# Patient Record
Sex: MALE | Race: OTHER | NOT HISPANIC OR LATINO
[De-identification: names, ages, dates, MRNs, and addresses within clinical notes are randomized per-mention and may not be internally consistent; named-entity substitution may affect disease eponyms.]

---

## 2023-04-11 PROBLEM — Z00.00 ENCOUNTER FOR PREVENTIVE HEALTH EXAMINATION: Status: ACTIVE | Noted: 2023-04-11

## 2023-04-12 ENCOUNTER — APPOINTMENT (OUTPATIENT)
Dept: OTOLARYNGOLOGY | Facility: CLINIC | Age: 28
End: 2023-04-12
Payer: MEDICAID

## 2023-04-12 VITALS — HEIGHT: 68 IN | BODY MASS INDEX: 27.58 KG/M2 | WEIGHT: 182 LBS

## 2023-04-12 PROCEDURE — 99204 OFFICE O/P NEW MOD 45 MIN: CPT | Mod: 25

## 2023-04-12 PROCEDURE — 31231 NASAL ENDOSCOPY DX: CPT

## 2023-04-12 RX ORDER — METHYLPREDNISOLONE 4 MG/1
4 TABLET ORAL
Qty: 5 | Refills: 0 | Status: ACTIVE | COMMUNITY
Start: 2023-04-12 | End: 1900-01-01

## 2023-04-12 NOTE — ASSESSMENT
[FreeTextEntry1] : Report of CT from pre-op in [place] reviewed showing ethmoidal polypoid tissue.\par \par Report of ESS surgery reviewed.\par \par Patient advised to stop oxymetazoline spray immediately. continue flonase and azelastine for now. \par RTC in 3 weeks then will probably do a CT if needed.

## 2023-04-12 NOTE — PROCEDURE
[Recalcitrant Symptoms] : recalcitrant symptoms  [Post-Op Patency] : post-op patency [Anterior rhinoscopy insufficient to account for symptoms] : anterior rhinoscopy insufficient to account for symptoms [None] : none [Rigid Endoscope] : examined with a rigid endoscope [Congested] : congested [Debbie] : debbie [Normal] : the paranasal sinuses had no abnormalities

## 2023-04-12 NOTE — REASON FOR VISIT
[Initial Evaluation] : an initial evaluation for [FreeTextEntry2] : sinusitis , b/l inferior turbinate hypertrophy

## 2023-04-12 NOTE — HISTORY OF PRESENT ILLNESS
[de-identified] : Patient presents today c/o sinusitis , b/l inferior turbinate hypertrophy .  Patient states he has nasal surgery in 2019.  He does not recall the exact name of the surgery .  The last few  years his nose has been feeling clogged and he can not breathe well without using nasal sprays. He uses oxymetazoline daily.  He sometimes snores at night.  He mostly has these symptoms at night,

## 2023-05-12 ENCOUNTER — APPOINTMENT (OUTPATIENT)
Dept: OTOLARYNGOLOGY | Facility: CLINIC | Age: 28
End: 2023-05-12
Payer: MEDICAID

## 2023-05-12 PROCEDURE — 31231 NASAL ENDOSCOPY DX: CPT

## 2023-05-12 PROCEDURE — 99213 OFFICE O/P EST LOW 20 MIN: CPT | Mod: 25

## 2023-05-12 NOTE — HISTORY OF PRESENT ILLNESS
[FreeTextEntry1] : Patient returns today  c/o rhinitis  medicamentosa , nasal obstruction . He has  finished  taking methylprednisolone and flonase/azelastine  , He does feel better, but still uses oxymetazoline at night.

## 2023-05-12 NOTE — REASON FOR VISIT
[Subsequent Evaluation] : a subsequent evaluation for [FreeTextEntry2] : rhinitis  medicamentosa , nasal obstruction

## 2023-05-12 NOTE — PROCEDURE
[Recalcitrant Symptoms] : recalcitrant symptoms  [Anterior rhinoscopy insufficient to account for symptoms] : anterior rhinoscopy insufficient to account for symptoms [None] : none [Rigid Endoscope] : examined with a rigid endoscope [Congested] : congested [Debbie] : debbie [Deviated to the Lt] : deviated to the left [Normal] : the middle meatus had no abnormalities

## 2023-06-05 ENCOUNTER — OUTPATIENT (OUTPATIENT)
Dept: OUTPATIENT SERVICES | Facility: HOSPITAL | Age: 28
LOS: 1 days | End: 2023-06-05
Payer: MEDICAID

## 2023-06-05 DIAGNOSIS — J31.0 CHRONIC RHINITIS: ICD-10-CM

## 2023-06-05 DIAGNOSIS — J34.89 OTHER SPECIFIED DISORDERS OF NOSE AND NASAL SINUSES: ICD-10-CM

## 2023-06-05 PROCEDURE — 70486 CT MAXILLOFACIAL W/O DYE: CPT

## 2023-06-05 PROCEDURE — 70486 CT MAXILLOFACIAL W/O DYE: CPT | Mod: 26

## 2023-06-06 DIAGNOSIS — J31.0 CHRONIC RHINITIS: ICD-10-CM

## 2023-06-06 DIAGNOSIS — J34.89 OTHER SPECIFIED DISORDERS OF NOSE AND NASAL SINUSES: ICD-10-CM

## 2023-06-28 ENCOUNTER — APPOINTMENT (OUTPATIENT)
Dept: OTOLARYNGOLOGY | Facility: CLINIC | Age: 28
End: 2023-06-28
Payer: MEDICAID

## 2023-06-28 VITALS — BODY MASS INDEX: 27.67 KG/M2 | WEIGHT: 182 LBS

## 2023-06-28 DIAGNOSIS — H61.23 IMPACTED CERUMEN, BILATERAL: ICD-10-CM

## 2023-06-28 PROCEDURE — 31231 NASAL ENDOSCOPY DX: CPT

## 2023-06-28 PROCEDURE — 69210 REMOVE IMPACTED EAR WAX UNI: CPT

## 2023-06-28 PROCEDURE — 99214 OFFICE O/P EST MOD 30 MIN: CPT | Mod: 25

## 2023-06-28 RX ORDER — SODIUM CHLORIDE 0.65 %
0.65 AEROSOL, SPRAY (ML) NASAL TWICE DAILY
Qty: 2 | Refills: 0 | Status: ACTIVE | COMMUNITY
Start: 2023-06-28 | End: 1900-01-01

## 2023-06-28 NOTE — DATA REVIEWED
[de-identified] : 6/05/2023      CT Maxillofacial \par \par \par  CT Maxillofacial No Cont             Final\par \par No Documents Attached\par \par \par \par \par   ACC: [id number]     EXAM:  CT MAXILLOFACIAL   ORDERED BY: LEYLA TRUONG\par \par PROCEDURE DATE:  06/05/2023\par \par \par \par INTERPRETATION:  CT EXAMINATION OF THE PARANASAL SINUSES\par \par CLINICAL INDICATION: Persistent symptoms after surgery.\par \par TECHNIQUE: CT examination of the paranasal sinuses was performed. These images were used to create axial, coronal and sagittal reformatted images through the paranasal sinuses. No intravenous contrast was administered.  The images were reviewed in bone and soft-tissue windows.\par \par COMPARISON: None available\par \par FINDINGS:\par \par Sinocranial & sinoorbital junctions: There is moderate osseous erosion in the right planum sphenoidale, cribriform plate, and lateral lamella. There is focal osseous dehiscence in the left planum sphenoidale. The fovea ethmoidalis are intact.\par \par Nasal septum and nasal cavity: There is leftward deviation of the vomer with bony spur to the left. There is rightward deviation of the anterior perpendicular plate. There is mildly expansile opacification in the superior right nasal cavity and in the right ethmoid sinus, obscuring the right superior and middle turbinates.\par \par Frontal sinuses, drainage pathways, and associated anatomic variants: There there is moderate polypoid mucosal thickening in bilateral frontal sinuses, right more the left, with opacification of the bilateral frontal sinus outflow tracts.\par \par Ethmoid sinuses: Status post right sphenoethmoidectomy. There is mildly expansile soft tissue density completely opacifying the right ethmoid sinus. There is scattered demineralization of the remaining anterior ethmoid septum. There is focal dehiscence in the posterior right lamina papyracea at the region of the orbital apex. There is partial opacification of the ethmoid sinus with demineralization of the septum. There is focal patchy hyperdense opacification in the posterior left ethmoid cell which could reflect fungal conization versus inspissated mucus.\par \par Sphenoid sinuses, drainage pathways, and associated variants: Status post left sphenoidotomy. Mild mucosal thickening in bilateral sphenoid sinuses. There are focal dehiscence in the bony covering of the medial right carotid canal in the lateral wall of the right sphenoid sinus, as well as in the medial walls of the bilateral foramen rotundum. The remaining carotid canals are covered by bone. Additional focal osseous dehiscence in the left optic strut.\par \par Maxillary sinuses, drainage pathways, and associated variants: Status post right maxillary antrostomy. there is moderate polypoid mucosal thickening the right maxillary sinus. There are large osseous dehiscence in the medial and posterior walls of the left maxillary sinus. There is partial opacification of the left maxillary sinus with large dehiscence in the posterior wall. There is effacement of the left retromaxillary fat pad. The fat pad within the left pterygopalatine fossa is preserved. There is dehiscence/demineralization of the left infraorbital canal.\par \par Other:\par Partially visualized intracranial structures: Normal\par Orbits: Normal\par Mastoid air cells: Normal\par Temporomandibular joints: Unremarkable.\par \par \par IMPRESSION:\par \par Postsurgical changes reflecting right sphenoethmoidectomy and bilateral maxillary antrostomies.\par \par Extensive scattered paranasal opacifications, worse in the right frontal/ethmoid sinuses and left maxillary sinus. Scattered osseous dehiscence/erosions along the sinus walls as above, most notably in the roof of the ethmoid sinuses (right more than left) and right cribriform plate. Large osseous dehiscence in the medial and posterior walls of the left maxillary sinus with effacement of the left retromaxillary fat pad. Additional focal osseous dehiscence as above. The finding could reflect sequelae of chronic invasive fungal sinusitis.\par \par Nasal septal deviation.\par \par \par \par --- End of Report ---\par \par \par \par \par \par JESSICA JUSTIN MD; Attending Radiologist\par This document has been electronically signed. Jun 5 2023  2:20PM\par \par  \par \par  Ordered by: LEYLA FUENTES       Collected/Examined: 05Jun2023 09:56AM       \par Verified by: LEYLA FUENTES 05Jun2023 03:05PM       \par  Result Communication: No patient communication needed at this time;\par Stage: Final       \par  Performed at: [facility]       Resulted: [id number] 12:51PM       Last Updated: 05Jun2023 03:05PM       Accession: [id number]

## 2023-06-28 NOTE — ASSESSMENT
[FreeTextEntry1] : I personally reviewed, interpreted and discussed patient's CT images showing chronic post-operative changes. possible dehiscence of skull base.\par No CSF leak suspicion on history .\par \par Patient stopped using oxymethazoline and feels a lot better.

## 2023-06-28 NOTE — HISTORY OF PRESENT ILLNESS
[FreeTextEntry1] : Patient returns today on Rhinitis medicamentosa ,  nasal obstruction.   Patient had a CT Maxillofacial done 06/05/2023 and is here to review the test results.  Patient states he has some improvement with his symptom and has no complaints. Has completely stopped taking oxymethazoline.

## 2023-06-28 NOTE — PHYSICAL EXAM
[Nasal Endoscopy Performed] : nasal endoscopy was performed, see procedure section for findings [Normal] : mucosa is normal [Midline] : trachea located in midline position [de-identified] : bilateral impacted wax cleaned with curette

## 2023-08-21 ENCOUNTER — APPOINTMENT (OUTPATIENT)
Dept: OTOLARYNGOLOGY | Facility: CLINIC | Age: 28
End: 2023-08-21

## 2023-11-01 ENCOUNTER — APPOINTMENT (OUTPATIENT)
Dept: OTOLARYNGOLOGY | Facility: CLINIC | Age: 28
End: 2023-11-01
Payer: MEDICAID

## 2023-11-01 DIAGNOSIS — T48.5X5A CHRONIC RHINITIS: ICD-10-CM

## 2023-11-01 DIAGNOSIS — J31.0 CHRONIC RHINITIS: ICD-10-CM

## 2023-11-01 DIAGNOSIS — J34.89 OTHER SPECIFIED DISORDERS OF NOSE AND NASAL SINUSES: ICD-10-CM

## 2023-11-01 DIAGNOSIS — Z98.890 OTHER SPECIFIED POSTPROCEDURAL STATES: ICD-10-CM

## 2023-11-01 PROCEDURE — 31231 NASAL ENDOSCOPY DX: CPT

## 2023-11-01 PROCEDURE — 99213 OFFICE O/P EST LOW 20 MIN: CPT | Mod: 25

## 2023-11-01 RX ORDER — FLUTICASONE PROPIONATE 50 UG/1
50 SPRAY, METERED NASAL
Qty: 1 | Refills: 4 | Status: ACTIVE | COMMUNITY
Start: 2023-06-28 | End: 1900-01-01

## 2024-04-04 ENCOUNTER — APPOINTMENT (OUTPATIENT)
Dept: OTOLARYNGOLOGY | Facility: CLINIC | Age: 29
End: 2024-04-04
Payer: COMMERCIAL

## 2024-04-04 DIAGNOSIS — B49 OTHER ALLERGIC RHINITIS: ICD-10-CM

## 2024-04-04 DIAGNOSIS — J30.89 OTHER ALLERGIC RHINITIS: ICD-10-CM

## 2024-04-04 PROCEDURE — 31231 NASAL ENDOSCOPY DX: CPT

## 2024-04-04 PROCEDURE — 99204 OFFICE O/P NEW MOD 45 MIN: CPT | Mod: 25

## 2024-04-04 NOTE — CONSULT LETTER
[Dear  ___] : Dear  [unfilled], [Courtesy Letter:] : I had the pleasure of seeing your patient, [unfilled], in my office today. [Consult Closing:] : Thank you very much for allowing me to participate in the care of this patient.  If you have any questions, please do not hesitate to contact me. [Sincerely,] : Sincerely, [FreeTextEntry3] : Gatito Cao MD Department of Otolaryngology - [facility]

## 2024-04-04 NOTE — ASSESSMENT
[FreeTextEntry1] : 28M here for initial evaluation. He reports long standing h/o nasal congestion/obstruction, facial pressure, headache and thick yellow mucus. He had surgery in [place] in 2019 after which he did well for 1-2 years, but sx have been worsening. After his surgery he recalls a protracted wound healing time and bedrest. There is no facial pain or numbness, no issues chewing or eating. 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 Recent CT sinus shows e/o prior sinus surgery with pansinus opacification and polypoid mucosal thickened; there are multiple areas of skull base dehiscence involving the right fovea/cribriform and planum, and left fovea and lateral maxillary sinus wall with osteitic bone throughout and areas of heterogenous soft tissue. MRI shows near complete opacification with enhancing T1 and T2 hypointense material of the right ethmoid extending into the sphenoid sinus/right anterior clinoid process; there is a similar enhancing lesion in the left maxillary sinus with extension posteriorly into the left  space involving the muscles of mastication and probably the bilateral pterygopalatine fossa. There is no abnormal dural or orbital signal. On exam, nasal endoscopy shows inferior turbinate hypertrophy with right septal deviation, diffuse sinonasal mucosal edema with e/o prior limited ESS w middle meatal obstruction. This pt has chronic pansinusitis and likely fungal sinusitis given history, bloodwork and imaging. He is symptomatic despite a prior surgery, which was incomplete, in 2019. There is also abnormal enhancing material in both the right ethmoid/sphenoid w demineralized adjacent bone, and also in the left maxillary sinus with extension to the  space all the way towards the TMJ with erosion of the lateral maxillary sinus wall - this is not usually typical of AFS and other likely etiologies include chronic invasive fungal sinusitis or sarcoidosis and EGPA. Regardless, given his history and imaging, he no doubt needs revision surgery to remove the allergic mucinous fungal debris and establish paranasal sinus patency. Further, he needs endoscopic examination under anesthesia with potential biopsy/resection of the material in the right sphenoid and retroantral spaces. This was all discussed and all questions answered. He will need to inform his parents of this, who currently live in [place], with whom he would like to recover, prior to proceeding. He will keep me posted about this, but I reiterated it should be done ASAP.

## 2024-04-04 NOTE — HISTORY OF PRESENT ILLNESS
[de-identified] : 28M here for initial evaluation.  He reports long standing h/o nasal congestion/obstruction, facial pressure, headache and thick yellow mucus. He had surgery in [place] in 2019 after which he did well for 1-2 years, but sx have been worsening.  After his surgery he recalls a protracted wound healing time and bedrest. There is no facial pain or numbness, no issues chewing or eating. 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  CT Sinus 3/11/24 (I reviewed): -e/o prior ESS w pansinus mucosal opacification -multiple areas of skull base dehiscence involving the right fovea/cribriform and planum, and left fovea and lateral maxilllary sinus wall. -osetitic bone throughout and heterogenous soft tissue  MRI Face 3/27/24: -Questionable dural involvement at the site of the dehiscence of the right cribriform plate/fovea ethmoidalis, without definite signal abnormality or enhancement in the underlying brain parenchyma.  -Near complete opacification with enhancing T1 and T2 hypointense material of the right ethmoid sinuses extending posteriorly into the sphenoid sinus/right anterior clinoid process. Similar enhancing lesion in the left maxillary sinus. There is extension posteriorly into the left  space involving the muscles of mastication and probably the bilateral pterygopalatine fossa. Questionable minimal orbital involvement at the right medial orbital wall and left orbital floor without significant inflammatory change or enhancement in the extraconal fat  ROS otherwise unremarkable.

## 2024-04-04 NOTE — PROCEDURE
[FreeTextEntry3] : Nasal Endoscopy: inferior turbinate hypertrophy right septal deviation diffuse sinonasal mucosal edema e/o prior limited ESS w middle meatal obstruction no mucopus

## 2024-04-04 NOTE — DATA REVIEWED
[de-identified] : MRI 3/2024: FINDINGS:  Evaluation of the dehiscence at the left posterolateral maxillary sinus wall, left medial orbital wall and left orbital floor, right cribriform plate, fovea ethmoidalis, planum sphenoidale and posterior right orbit are better appreciated on CT 3/11/2024.  Near complete opacification with enhancing T1 and T2 hypointense material of the right ethmoid sinuses extending posteriorly into the sphenoid sinus/right anterior clinoid process. Similar enhancing lesion in the left maxillary sinus. There is extension posteriorly into the left  space involving the muscles of mastication and probably the bilateral pterygopalatine fossa. Questionable minimal orbital involvement at the right medial orbital wall and left orbital floor without significant inflammatory change or enhancement in the extraconal fat. Findings may reflect sinonasal malignancy, sinonasal lymphoma, sarcoidosis and granulomatosis with polyangiitis on other etiologies. Biopsy is recommended. Otherwise mild to moderate sinus opacification with mucous retention cysts or secretions or mucosal thickening. Coexistent sinusitis cannot be excluded.  Evaluation of the dehiscence at the left posterolateral maxillary sinus wall, left medial orbital wall and left orbital floor, right cribriform plate, fovea ethmoidalis, planum sphenoidale and posterior right orbit are better appreciated on CT 3/11/2024.  Questionable dural involvement at the site of the dehiscence of the right cribriform plate/fovea ethmoidalis, 107:21 without definite signal abnormality or enhancement in the underlying brain parenchyma. Clinical correlation and dedicated MRI of the brain with and without contrast recommended to exclude intracranial spread or infection  Evaluation of ostiomeatal units is limited by modality and coexistent disease. Leftward nasal septal deviation and spurring inferiorly with mild rightward bowing superiorly is better appreciated on recent CT.  The visualized soft tissues of the nasopharynx, orbits and brain are unremarkable.  The mastoid air cells are normally aerated.  IMPRESSION: 1. Questionable dural involvement at the site of the dehiscence of the right cribriform plate/fovea ethmoidalis, without definite signal abnormality or enhancement in the underlying brain parenchyma. Clinical correlation and dedicated MRI of the brain with and without contrast on a 3 Romina magnet with 1 mm postcontrast T1 and FLAIR images recommended to exclude intracranial spread of infection, inflammation or neoplasm. 2. Near complete opacification with enhancing T1 and T2 hypointense material of the right ethmoid sinuses extending posteriorly into the sphenoid sinus/right anterior clinoid process. Similar enhancing lesion in the left maxillary sinus. There is extension posteriorly into the left  space involving the muscles of mastication and probably the bilateral pterygopalatine fossa. Questionable minimal orbital involvement at the right medial orbital wall and left orbital floor without significant inflammatory change or enhancement in the extraconal fat  CT Sinus 3/2024: Paranasal sinuses: There is polypoid mucosal thickening of the paranasal sinuses with complete opacification of the sphenoid sinuses, ethmoid air cells, and frontal sinuses. There is partial opacification of the left maxillary sinus and mild mucosal thickening of the right maxillary sinus. There is dehiscence of the posterior lateral wall of the left maxilla. The mucosal disease cannot be  from the left pterygoid muscles. There is dehiscence of the medial wall of the left orbit as well as the floor of the left orbit. There is dehiscence cribriform plate, fovea ethmoidalis and the planum sphenoidale on the right. There is dehiscence of the posterior medial wall of the right orbit. There is coalescence of the ethmoid air cells. There is a 1.4 cm hyperdensity posterior left ethmoid air cell which may resent inspissated secretions. There are bilateral medial antrostomies. Air/fluid levels: None. Mastoids: Clear bilaterally.  Orbital soft tissues: Intra orbital soft tissues are intact and symmetric.   Other bones: The nasal bones, zygomatic arches, pterygoid plates and hard palate are intact. The temporal mandibular joints are intact. Nasal septum: Deviated towards the right anteriorly. Nasal cavity & nasopharynx: There is opacification of the right nasal cavity adjacent to the right middle turbinate. This is contiguous with the ethmoid air cells. Brain: No hydrocephalus or midline shift in this limited evaluation.  IMPRESSION: 1. There is extensive polypoid mucosal thickening of the paranasal sinuses with near complete opacification of the sphenoid sinuses, ethmoid air cells and frontal sinuses. There is partial opacification left maxillary sinus and mild mucosal thickening right maxillary sinus. 2. There is dehiscence of the posterior lateral wall of the left maxilla. Soft tissue densities as mucosal disease in the left maxillary sinus cannot be  from the left pterygoid muscles. 3. There is dehiscence of the medial wall of the left orbit as well as the floor of the left orbit. 4. There is dehiscence of the cribriform plate, fovea ethmoidalis and the planum sphenoidale on the right. 5. There is dehiscence of the medial wall of the right orbit posteriorly. 6. There is coalescence of the ethmoid air cells. 7. There are bilateral medial antrostomies. Correlation with prior surgical history is suggested.

## 2024-04-12 ENCOUNTER — APPOINTMENT (OUTPATIENT)
Dept: OTOLARYNGOLOGY | Facility: CLINIC | Age: 29
End: 2024-04-12
Payer: COMMERCIAL

## 2024-04-12 VITALS
BODY MASS INDEX: 25.76 KG/M2 | HEIGHT: 68 IN | DIASTOLIC BLOOD PRESSURE: 74 MMHG | WEIGHT: 170 LBS | TEMPERATURE: 97.7 F | HEART RATE: 62 BPM | SYSTOLIC BLOOD PRESSURE: 113 MMHG

## 2024-04-12 DIAGNOSIS — J34.89 OTHER SPECIFIED DISORDERS OF NOSE AND NASAL SINUSES: ICD-10-CM

## 2024-04-12 PROCEDURE — 99213 OFFICE O/P EST LOW 20 MIN: CPT | Mod: 25

## 2024-04-12 PROCEDURE — 31231 NASAL ENDOSCOPY DX: CPT

## 2024-04-12 NOTE — ASSESSMENT
[FreeTextEntry1] : 28M here in followup. He reports long standing h/o nasal congestion/obstruction, facial pressure, headache and thick yellow mucus. He had surgery in [place] in 2019 after which he did well for 1-2 years, but sx have been worsening. After his surgery he recalls a protracted wound healing time and bedrest. There is no facial pain or numbness, no issues chewing or eating. 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 Recent CT sinus shows e/o prior sinus surgery with pansinus opacification and polypoid mucosal thickened; there are multiple areas of skull base dehiscence involving the right fovea/cribriform and planum, and left fovea and lateral maxillary sinus wall with osteitic bone throughout and areas of heterogenous soft tissue. MRI shows near complete opacification with enhancing T1 and T2 hypointense material of the right ethmoid extending into the sphenoid sinus/right anterior clinoid process; there is a similar enhancing lesion in the left maxillary sinus with extension posteriorly into the left  space involving the muscles of mastication and probably the bilateral pterygopalatine fossa. There is no abnormal dural or orbital signal. On exam, nasal endoscopy shows inferior turbinate hypertrophy with right septal deviation, diffuse sinonasal mucosal edema with e/o prior limited ESS w middle meatal obstruction. This pt has chronic pansinusitis with prior sinus surgery in 2019 with reported h/o fungal sinusitis and fungal allergy on bloodwork. There is also abnormal enhancing material in both the right ethmoid/sphenoid w demineralized adjacent bone, and also in the left maxillary sinus with extension to the  space all the way towards the TMJ with erosion of the lateral maxillary sinus wall - this is not typical of AFS and signal is NOT consistent with fungus on MRI, rather a very cellular process favoring lymphoma; other possibilities include granulomatous disease like chronic invasive fungal sinusitis or sarcoidosis or EGPA. At this point - he really needs endoscopic examination under anesthesia with biopsy of the material in the right sphenoid and retroantral spaces. This was all discussed and all questions answered. This was reviewed at length and all questions answered - plan for this in the next 2-3 weeks.

## 2024-04-12 NOTE — CONSULT LETTER
[Dear  ___] : Dear  [unfilled], [Courtesy Letter:] : I had the pleasure of seeing your patient, [unfilled], in my office today. [Consult Closing:] : Thank you very much for allowing me to participate in the care of this patient.  If you have any questions, please do not hesitate to contact me. [Sincerely,] : Sincerely, [FreeTextEntry3] : Gatito Cao MD Department of Otolaryngology - [facility] [DrAj  ___] : Dr. BACK

## 2024-04-12 NOTE — HISTORY OF PRESENT ILLNESS
[de-identified] : 28M here in followup.  He reports long standing h/o nasal congestion/obstruction, facial pressure, headache and thick yellow mucus. He had surgery in [place] in 2019 after which he did well for 1-2 years, but sx have been worsening.  After his surgery he recalls a protracted wound healing time and bedrest. There is no facial pain or numbness, no issues chewing or eating. He does have this very uncomfortable feeling in his 'face' on the left side, most apparent at night when trying to sleep. 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  CT Sinus 3/11/24 (I reviewed): -e/o prior ESS w pansinus mucosal opacification -multiple areas of skull base dehiscence involving the right fovea/cribriform and planum, and left fovea and lateral maxilllary sinus wall. -osetitic bone throughout and heterogenous soft tissue  MRI Face 3/27/24: -Questionable dural involvement at the site of the dehiscence of the right cribriform plate/fovea ethmoidalis, without definite signal abnormality or enhancement in the underlying brain parenchyma.  -Near complete opacification with enhancing T1 and T2 hypointense material of the right ethmoid sinuses extending posteriorly into the sphenoid sinus/right anterior clinoid process. Similar enhancing lesion in the left maxillary sinus. There is extension posteriorly into the left  space involving the muscles of mastication and probably the bilateral pterygopalatine fossa. Questionable minimal orbital involvement at the right medial orbital wall and left orbital floor without significant inflammatory change or enhancement in the extraconal fat  ROS otherwise unremarkable.

## 2024-04-30 ENCOUNTER — TRANSCRIPTION ENCOUNTER (OUTPATIENT)
Age: 29
End: 2024-04-30

## 2024-04-30 NOTE — ASU PATIENT PROFILE, ADULT - NSICDXPASTSURGICALHX_GEN_ALL_CORE_FT
PAST SURGICAL HISTORY:  No significant past surgical history PAST SURGICAL HISTORY:  H/O sinus surgery

## 2024-04-30 NOTE — ASU PATIENT PROFILE, ADULT - NSICDXPASTMEDICALHX_GEN_ALL_CORE_FT
PAST MEDICAL HISTORY:  No pertinent past medical history PAST MEDICAL HISTORY:  Asthma childhood

## 2024-04-30 NOTE — ASU PATIENT PROFILE, ADULT - NS PREOP UNDERSTANDS INFO
Spoke to patient  to be NPO/NO solid foods after 2200 pm on tonight. allow to drink water  or apple juice till 12Mn,  dres scomfortable, leave all valuable a t home,  Bring ID photo and  insurance cards,  escort arranged , address  and telephone given to patient/yes

## 2024-05-01 ENCOUNTER — APPOINTMENT (OUTPATIENT)
Dept: OTOLARYNGOLOGY | Facility: HOSPITAL | Age: 29
End: 2024-05-01

## 2024-05-01 ENCOUNTER — RESULT REVIEW (OUTPATIENT)
Age: 29
End: 2024-05-01

## 2024-05-01 ENCOUNTER — TRANSCRIPTION ENCOUNTER (OUTPATIENT)
Age: 29
End: 2024-05-01

## 2024-05-01 ENCOUNTER — OUTPATIENT (OUTPATIENT)
Dept: OUTPATIENT SERVICES | Facility: HOSPITAL | Age: 29
LOS: 1 days | Discharge: HOME | End: 2024-05-01
Payer: MEDICAID

## 2024-05-01 VITALS
HEIGHT: 68 IN | DIASTOLIC BLOOD PRESSURE: 79 MMHG | WEIGHT: 165.57 LBS | RESPIRATION RATE: 14 BRPM | HEART RATE: 65 BPM | TEMPERATURE: 98 F | SYSTOLIC BLOOD PRESSURE: 125 MMHG | OXYGEN SATURATION: 98 %

## 2024-05-01 VITALS
HEART RATE: 77 BPM | SYSTOLIC BLOOD PRESSURE: 110 MMHG | TEMPERATURE: 98 F | RESPIRATION RATE: 16 BRPM | DIASTOLIC BLOOD PRESSURE: 59 MMHG | OXYGEN SATURATION: 99 %

## 2024-05-01 DIAGNOSIS — Z98.890 OTHER SPECIFIED POSTPROCEDURAL STATES: Chronic | ICD-10-CM

## 2024-05-01 PROCEDURE — 88305 TISSUE EXAM BY PATHOLOGIST: CPT | Mod: 26

## 2024-05-01 PROCEDURE — 61782 SCAN PROC CRANIAL EXTRA: CPT

## 2024-05-01 PROCEDURE — 88311 DECALCIFY TISSUE: CPT | Mod: 26

## 2024-05-01 PROCEDURE — 31267 ENDOSCOPY MAXILLARY SINUS: CPT | Mod: 50

## 2024-05-01 PROCEDURE — 31259 NSL/SINS NDSC SPHN TISS RMVL: CPT | Mod: 50

## 2024-05-01 PROCEDURE — 88342 IMHCHEM/IMCYTCHM 1ST ANTB: CPT | Mod: 26

## 2024-05-01 PROCEDURE — 30930 THER FX NASAL INF TURBINATE: CPT

## 2024-05-01 PROCEDURE — 88331 PATH CONSLTJ SURG 1 BLK 1SPC: CPT | Mod: 26

## 2024-05-01 DEVICE — SURGIFLO HEMOSTATIC MATRIX KIT: Type: IMPLANTABLE DEVICE | Status: FUNCTIONAL

## 2024-05-01 RX ORDER — APREPITANT 80 MG/1
40 CAPSULE ORAL ONCE
Refills: 0 | Status: COMPLETED | OUTPATIENT
Start: 2024-05-01 | End: 2024-05-01

## 2024-05-01 RX ORDER — ACETAMINOPHEN 500 MG
1000 TABLET ORAL ONCE
Refills: 0 | Status: COMPLETED | OUTPATIENT
Start: 2024-05-01 | End: 2024-05-01

## 2024-05-01 RX ORDER — SODIUM CHLORIDE 9 MG/ML
500 INJECTION, SOLUTION INTRAVENOUS
Refills: 0 | Status: ACTIVE | OUTPATIENT
Start: 2024-05-01 | End: 2025-03-30

## 2024-05-01 RX ORDER — BUDESONIDE 0.5 MG/2ML
0.5 INHALANT ORAL
Qty: 3 | Refills: 3 | Status: ACTIVE | COMMUNITY
Start: 2024-05-01 | End: 1900-01-01

## 2024-05-01 RX ORDER — RACEPINEPHRINE HCL 2.25 %
1 SOLUTION, NON-ORAL TOPICAL ONCE
Refills: 0 | Status: ACTIVE | OUTPATIENT
Start: 2024-05-01

## 2024-05-01 RX ADMIN — APREPITANT 40 MILLIGRAM(S): 80 CAPSULE ORAL at 07:12

## 2024-05-01 RX ADMIN — SODIUM CHLORIDE 100 MILLILITER(S): 9 INJECTION, SOLUTION INTRAVENOUS at 10:29

## 2024-05-01 RX ADMIN — Medication 1000 MILLIGRAM(S): at 07:13

## 2024-05-01 NOTE — BRIEF OPERATIVE NOTE - OPERATION/FINDINGS
Left nasal cavity: fibrous mass involving maxillary sinus inferior turbinate, posterior ethmoids. Fungus ball within posterior ethmoid air cell, purulence from sphenoid  Right nasal cavity: fibrous mass arising from posterior ethmoid air cells, involving middle turbinate

## 2024-05-01 NOTE — ASU DISCHARGE PLAN (ADULT/PEDIATRIC) - CARE PROVIDER_API CALL
Gatito Cao  Otolaryngology  [address] [place] [address], [address]  [place] [address]  Phone: [phone]  Fax: [phone]  Follow Up Time:

## 2024-05-01 NOTE — BRIEF OPERATIVE NOTE - NSICDXBRIEFPROCEDURE_GEN_ALL_CORE_FT
PROCEDURES:  Endoscopy, nasal, with biopsy 01-May-2024 09:26:32  Mendelsohn, Matthew  Endoscopic bilateral sphenoidotomy 01-May-2024 09:26:47  Mendelsohn, Matthew  Endoscopic partial ethmoidectomy 01-May-2024 09:27:22  Mendelsohn, Matthew

## 2024-05-03 LAB
-  AMOXICILLIN/CLAVULANIC ACID: SIGNIFICANT CHANGE UP
-  AMPICILLIN/SULBACTAM: SIGNIFICANT CHANGE UP
-  AMPICILLIN: SIGNIFICANT CHANGE UP
-  CEFAZOLIN: SIGNIFICANT CHANGE UP
-  CEFEPIME: SIGNIFICANT CHANGE UP
-  CEFOXITIN: SIGNIFICANT CHANGE UP
-  CEFTRIAXONE: SIGNIFICANT CHANGE UP
-  CIPROFLOXACIN: SIGNIFICANT CHANGE UP
-  CLINDAMYCIN: SIGNIFICANT CHANGE UP
-  ERYTHROMYCIN: SIGNIFICANT CHANGE UP
-  GENTAMICIN: SIGNIFICANT CHANGE UP
-  GENTAMICIN: SIGNIFICANT CHANGE UP
-  LEVOFLOXACIN: SIGNIFICANT CHANGE UP
-  OXACILLIN: SIGNIFICANT CHANGE UP
-  PENICILLIN: SIGNIFICANT CHANGE UP
-  PIPERACILLIN/TAZOBACTAM: SIGNIFICANT CHANGE UP
-  RIFAMPIN: SIGNIFICANT CHANGE UP
-  TETRACYCLINE: SIGNIFICANT CHANGE UP
-  TOBRAMYCIN: SIGNIFICANT CHANGE UP
-  TRIMETHOPRIM/SULFAMETHOXAZOLE: SIGNIFICANT CHANGE UP
-  TRIMETHOPRIM/SULFAMETHOXAZOLE: SIGNIFICANT CHANGE UP
-  VANCOMYCIN: SIGNIFICANT CHANGE UP
METHOD TYPE: SIGNIFICANT CHANGE UP
METHOD TYPE: SIGNIFICANT CHANGE UP
SURGICAL PATHOLOGY STUDY: SIGNIFICANT CHANGE UP

## 2024-05-06 LAB
CULTURE RESULTS: ABNORMAL
ORGANISM # SPEC MICROSCOPIC CNT: ABNORMAL
ORGANISM # SPEC MICROSCOPIC CNT: ABNORMAL
ORGANISM # SPEC MICROSCOPIC CNT: SIGNIFICANT CHANGE UP
SPECIMEN SOURCE: SIGNIFICANT CHANGE UP

## 2024-05-07 ENCOUNTER — APPOINTMENT (OUTPATIENT)
Dept: OTOLARYNGOLOGY | Facility: CLINIC | Age: 29
End: 2024-05-07
Payer: COMMERCIAL

## 2024-05-07 DIAGNOSIS — J32.4 CHRONIC PANSINUSITIS: ICD-10-CM

## 2024-05-07 PROBLEM — J45.909 UNSPECIFIED ASTHMA, UNCOMPLICATED: Chronic | Status: ACTIVE | Noted: 2024-05-01

## 2024-05-07 PROCEDURE — 99024 POSTOP FOLLOW-UP VISIT: CPT

## 2024-05-07 PROCEDURE — 31237 NSL/SINS NDSC SURG BX POLYPC: CPT | Mod: 58

## 2024-05-07 NOTE — PROCEDURE
[FreeTextEntry3] : Nasal Endoscopy: crusting and scab removed nasal airways patent no mucopus no necrosis choana clear

## 2024-05-07 NOTE — ASSESSMENT
[FreeTextEntry1] : 28M here in first postoperative visit s/p ESS (max, ethmoid, sphenoid) with directed biopsies 5/1/24. Intraoperatively, severely sclerotic infiltrative process in left retroantral/infratemporal and ethmoids, and right sphenoethmoid region with purulence in the left sphenoid. Pathology is most c/w chronic invasive fungal sinusitis, as above. He is doing well since surgery. There is no pain or congestion. He is using budesonide rinses. On exam, nasal endoscopy shows expected postoperative changes. This case is very unique in its presentation and to the degree and extent of irregular, infiltrative and sclerotic disease throughout both separate lesions. Pathology seems c/w chronic invasive fungal sinusitis. PCR testing is pending on exactly which organism - mucor vs aspergillus. For now, he will continue budesonide rinses. He db see my ID colleague to start aggressive, prolonged antifungal treatment which will be the next step. RTO 4 weeks.

## 2024-05-07 NOTE — DATA REVIEWED
[de-identified] : MRI 3/2024: FINDINGS:  Evaluation of the dehiscence at the left posterolateral maxillary sinus wall, left medial orbital wall and left orbital floor, right cribriform plate, fovea ethmoidalis, planum sphenoidale and posterior right orbit are better appreciated on CT 3/11/2024.  Near complete opacification with enhancing T1 and T2 hypointense material of the right ethmoid sinuses extending posteriorly into the sphenoid sinus/right anterior clinoid process. Similar enhancing lesion in the left maxillary sinus. There is extension posteriorly into the left  space involving the muscles of mastication and probably the bilateral pterygopalatine fossa. Questionable minimal orbital involvement at the right medial orbital wall and left orbital floor without significant inflammatory change or enhancement in the extraconal fat. Findings may reflect sinonasal malignancy, sinonasal lymphoma, sarcoidosis and granulomatosis with polyangiitis on other etiologies. Biopsy is recommended. Otherwise mild to moderate sinus opacification with mucous retention cysts or secretions or mucosal thickening. Coexistent sinusitis cannot be excluded.  Evaluation of the dehiscence at the left posterolateral maxillary sinus wall, left medial orbital wall and left orbital floor, right cribriform plate, fovea ethmoidalis, planum sphenoidale and posterior right orbit are better appreciated on CT 3/11/2024.  Questionable dural involvement at the site of the dehiscence of the right cribriform plate/fovea ethmoidalis, 107:21 without definite signal abnormality or enhancement in the underlying brain parenchyma. Clinical correlation and dedicated MRI of the brain with and without contrast recommended to exclude intracranial spread or infection  Evaluation of ostiomeatal units is limited by modality and coexistent disease. Leftward nasal septal deviation and spurring inferiorly with mild rightward bowing superiorly is better appreciated on recent CT.  The visualized soft tissues of the nasopharynx, orbits and brain are unremarkable.  The mastoid air cells are normally aerated.  IMPRESSION: 1. Questionable dural involvement at the site of the dehiscence of the right cribriform plate/fovea ethmoidalis, without definite signal abnormality or enhancement in the underlying brain parenchyma. Clinical correlation and dedicated MRI of the brain with and without contrast on a 3 Romina magnet with 1 mm postcontrast T1 and FLAIR images recommended to exclude intracranial spread of infection, inflammation or neoplasm. 2. Near complete opacification with enhancing T1 and T2 hypointense material of the right ethmoid sinuses extending posteriorly into the sphenoid sinus/right anterior clinoid process. Similar enhancing lesion in the left maxillary sinus. There is extension posteriorly into the left  space involving the muscles of mastication and probably the bilateral pterygopalatine fossa. Questionable minimal orbital involvement at the right medial orbital wall and left orbital floor without significant inflammatory change or enhancement in the extraconal fat  CT Sinus 3/2024: Paranasal sinuses: There is polypoid mucosal thickening of the paranasal sinuses with complete opacification of the sphenoid sinuses, ethmoid air cells, and frontal sinuses. There is partial opacification of the left maxillary sinus and mild mucosal thickening of the right maxillary sinus. There is dehiscence of the posterior lateral wall of the left maxilla. The mucosal disease cannot be  from the left pterygoid muscles. There is dehiscence of the medial wall of the left orbit as well as the floor of the left orbit. There is dehiscence cribriform plate, fovea ethmoidalis and the planum sphenoidale on the right. There is dehiscence of the posterior medial wall of the right orbit. There is coalescence of the ethmoid air cells. There is a 1.4 cm hyperdensity posterior left ethmoid air cell which may resent inspissated secretions. There are bilateral medial antrostomies. Air/fluid levels: None. Mastoids: Clear bilaterally.  Orbital soft tissues: Intra orbital soft tissues are intact and symmetric.   Other bones: The nasal bones, zygomatic arches, pterygoid plates and hard palate are intact. The temporal mandibular joints are intact. Nasal septum: Deviated towards the right anteriorly. Nasal cavity & nasopharynx: There is opacification of the right nasal cavity adjacent to the right middle turbinate. This is contiguous with the ethmoid air cells. Brain: No hydrocephalus or midline shift in this limited evaluation.  IMPRESSION: 1. There is extensive polypoid mucosal thickening of the paranasal sinuses with near complete opacification of the sphenoid sinuses, ethmoid air cells and frontal sinuses. There is partial opacification left maxillary sinus and mild mucosal thickening right maxillary sinus. 2. There is dehiscence of the posterior lateral wall of the left maxilla. Soft tissue densities as mucosal disease in the left maxillary sinus cannot be  from the left pterygoid muscles. 3. There is dehiscence of the medial wall of the left orbit as well as the floor of the left orbit. 4. There is dehiscence of the cribriform plate, fovea ethmoidalis and the planum sphenoidale on the right. 5. There is dehiscence of the medial wall of the right orbit posteriorly. 6. There is coalescence of the ethmoid air cells. 7. There are bilateral medial antrostomies. Correlation with prior surgical history is suggested.

## 2024-05-07 NOTE — HISTORY OF PRESENT ILLNESS
[de-identified] : 28M here in first postoperative visit s/p ESS (max, ethmoid, sphenoid) with directed biopsies 5/1/24. Intraoperatively, severely sclerotic infiltrative process in left retroantral/infratemporal and ethmoids, and right sphenoethmoid region with purulence in the left sphenoid. Copious tissue sent for pathology.  He is doing well since surgery. There is no pain or congestion. He is using budesonide rinses.  Pathology: 1.  Right maxillary lesion (R/O noplasm, lymphoma, fungal, granulomas): -   Invasive fungal sinusitis, favor Mucormycosis (see note). 2.  Left nasal cavity mass: -   Invasive fungal sinusitis, involving densely hyalinized fibrous tissue with abundant giant cells and focal necrosis (see note). 3.  Right nasal cavity: -   Invasive fungal sinusitis, involving polypoid sinonasal respiratory mucosa with extensive hyalinizing fibrosis, scattered giant cells and brisk mixed inflammatory infiltrate (eosinophils, lymphocytes and plasma cells). 4.  Right middle turbinate: -   Invasive fungal sinusitis, involving polypoid sinonasal tissue with hyalinizing fibrosis, florid giant cell reaction and mixed inflammatory infiltrate (see note). -   Inflammation focally extends into bone. 5.  Right sinus contents: -   Invasive fungal sinusitis, involving respiratory mucosa and hyalinized fibrous tissue with abundant giant cells (see note). 6.  Left infratemporal: -   Hyalinizing fibrosis with scattered giant cells and crushed inflammatory cells. 7.  Bilateral sinus shavings: -  Inflamed sinonasal tissues and necrotic debris admixed with neutrophils and abundant fungus (see note). 8.  Right posterior ethmoid:a -   Hyalinized fibrous tissue with scattered giant cells, consistent with involvement by invasive fungal sinusitis. Note: The many tissue samples submitted appear similar, by GMS revealing abundant fungal hyphae embedded in densely hyalinized fibrous tissue and/or engulfed by numerous multinucleated giant cells.  No well-formed granulomas are seen.  Scant uninvolved respiratory mucosa is also present. GMS highlights tangles of fungal hyphae, ribbons and twisted forms, morphologically favoring Mucorales species. However degeneration precludes reliable elucidation. PAS and AFB stains, non contributory (1FSA,2A,4A,5A,7D). -->Selective blocks will be forwarded for Fungal PCR testing.  ROS otherwise unremarkable.

## 2024-05-17 ENCOUNTER — LABORATORY RESULT (OUTPATIENT)
Age: 29
End: 2024-05-17

## 2024-05-17 ENCOUNTER — APPOINTMENT (OUTPATIENT)
Dept: INFECTIOUS DISEASE | Facility: CLINIC | Age: 29
End: 2024-05-17

## 2024-05-17 ENCOUNTER — APPOINTMENT (OUTPATIENT)
Dept: INFECTIOUS DISEASE | Facility: CLINIC | Age: 29
End: 2024-05-17
Payer: COMMERCIAL

## 2024-05-17 VITALS
HEART RATE: 73 BPM | WEIGHT: 168.25 LBS | DIASTOLIC BLOOD PRESSURE: 80 MMHG | OXYGEN SATURATION: 99 % | TEMPERATURE: 97.8 F | BODY MASS INDEX: 25.5 KG/M2 | RESPIRATION RATE: 16 BRPM | HEIGHT: 68 IN | SYSTOLIC BLOOD PRESSURE: 120 MMHG

## 2024-05-17 PROCEDURE — 36415 COLL VENOUS BLD VENIPUNCTURE: CPT

## 2024-05-17 PROCEDURE — 99205 OFFICE O/P NEW HI 60 MIN: CPT | Mod: 25

## 2024-05-17 RX ORDER — DOXYCYCLINE 100 MG/1
100 TABLET, FILM COATED ORAL TWICE DAILY
Qty: 20 | Refills: 0 | Status: COMPLETED | COMMUNITY
Start: 2024-05-01 | End: 2024-05-13

## 2024-05-17 NOTE — PHYSICAL EXAM
[General Appearance - Alert] : alert [General Appearance - In No Acute Distress] : in no acute distress [General Appearance - Well-Appearing] : healthy appearing [Sclera] : the sclera and conjunctiva were normal [PERRL With Normal Accommodation] : pupils were equal in size, round, reactive to light [Extraocular Movements] : extraocular movements were intact [Outer Ear] : the ears and nose were normal in appearance [Examination Of The Oral Cavity] : the lips and gums were normal [Oropharynx] : the oropharynx was normal with no thrush [Auscultation Breath Sounds / Voice Sounds] : lungs were clear to auscultation bilaterally [Heart Rate And Rhythm] : heart rate was normal and rhythm regular [Heart Sounds] : normal S1 and S2 [Murmurs] : no murmurs [Edema] : there was no peripheral edema [Bowel Sounds] : normal bowel sounds [Abdomen Soft] : soft [Abdomen Tenderness] : non-tender [Costovertebral Angle Tenderness] : no CVA tenderness [No Palpable Adenopathy] : no palpable adenopathy [Musculoskeletal - Swelling] : no joint swelling [Skin Color & Pigmentation] : normal skin color and pigmentation [] : no rash

## 2024-05-20 LAB
A FUMIGATUS IGE QN: 3.17 KUA/L
ALBUMIN SERPL ELPH-MCNC: 4.9 G/DL
ALP BLD-CCNC: 90 U/L
ALT SERPL-CCNC: 22 U/L
ANION GAP SERPL CALC-SCNC: 11 MMOL/L
AST SERPL-CCNC: 18 U/L
BASOPHILS # BLD AUTO: 0.06 K/UL
BASOPHILS NFR BLD AUTO: 0.8 %
BILIRUB SERPL-MCNC: 0.4 MG/DL
BUN SERPL-MCNC: 5 MG/DL
CALCIUM SERPL-MCNC: 10.1 MG/DL
CD3 CELLS # BLD: 1156 CELLS/UL
CD3 CELLS NFR BLD: 66 %
CD3+CD4+ CELLS # BLD: 655 CELLS/UL
CD3+CD4+ CELLS NFR BLD: 37 %
CD3+CD4+ CELLS/CD3+CD8+ CLL SPEC: 1.49 RATIO
CD3+CD8+ CELLS # SPEC: 440 CELLS/UL
CD3+CD8+ CELLS NFR BLD: 25 %
CHLORIDE SERPL-SCNC: 100 MMOL/L
CO2 SERPL-SCNC: 27 MMOL/L
CREAT SERPL-MCNC: 0.89 MG/DL
CRP SERPL-MCNC: 4 MG/L
DEPRECATED A FUMIGATUS IGE RAST QL: 2 (ref 0–?)
DEPRECATED KAPPA LC FREE/LAMBDA SER: 1.33 RATIO
EGFR: 120 ML/MIN/1.73M2
EOSINOPHIL # BLD AUTO: 0.22 K/UL
EOSINOPHIL NFR BLD AUTO: 3 %
ERYTHROCYTE [SEDIMENTATION RATE] IN BLOOD BY WESTERGREN METHOD: 14 MM/HR
GLUCOSE SERPL-MCNC: 91 MG/DL
HCT VFR BLD CALC: 42.5 %
HGB BLD-MCNC: 13.9 G/DL
HIV1+2 AB SPEC QL IA.RAPID: NONREACTIVE
IGA SER QL IEP: 220 MG/DL
IGG SER QL IEP: 1458 MG/DL
IGM SER QL IEP: 113 MG/DL
IMM GRANULOCYTES NFR BLD AUTO: 0.1 %
KAPPA LC CSF-MCNC: 1.87 MG/DL
KAPPA LC SERPL-MCNC: 2.48 MG/DL
LYMPHOCYTES # BLD AUTO: 1.63 K/UL
LYMPHOCYTES NFR BLD AUTO: 22.3 %
MAN DIFF?: NORMAL
MCHC RBC-ENTMCNC: 26.6 PG
MCHC RBC-ENTMCNC: 32.7 GM/DL
MCV RBC AUTO: 81.4 FL
MONOCYTES # BLD AUTO: 0.53 K/UL
MONOCYTES NFR BLD AUTO: 7.3 %
NEUTROPHILS # BLD AUTO: 4.85 K/UL
NEUTROPHILS NFR BLD AUTO: 66.5 %
PLATELET # BLD AUTO: 356 K/UL
POTASSIUM SERPL-SCNC: 4.3 MMOL/L
PROT SERPL-MCNC: 7.9 G/DL
RBC # BLD: 5.22 M/UL
RBC # FLD: 13.4 %
SODIUM SERPL-SCNC: 138 MMOL/L
TOTAL IGE SMQN RAST: 1045 KU/L
WBC # FLD AUTO: 7.3 K/UL

## 2024-05-24 LAB
A FLAVUS AB FLD QL: ABNORMAL
A FUMIGATUS AB FLD QL: ABNORMAL
A NIGER AB FLD QL: NEGATIVE
FUNGITELL QUANTITATIVE VALUE: 239 PG/ML
GALACTOMANNAN AG SERPL QL IA: 0.05 INDEX

## 2024-06-04 LAB — FUNGITELL QUANTITATIVE VALUE: 268 PG/ML

## 2024-06-11 ENCOUNTER — APPOINTMENT (OUTPATIENT)
Dept: INFECTIOUS DISEASE | Facility: CLINIC | Age: 29
End: 2024-06-11
Payer: COMMERCIAL

## 2024-06-11 PROCEDURE — 99213 OFFICE O/P EST LOW 20 MIN: CPT

## 2024-06-11 NOTE — ASSESSMENT
[FreeTextEntry1] : 27 yo M with invasive fungal sinusitis - organism not known.  Need to be able to identify organism as susceptibilities will differ.  Ideally, would have growth in culture so that susceptibilities can be obtained.  I am working with the [facility] to see if there are additional options for molecular diagnostics (eg CDC).  Discussed with Dr. Cao on 6/7 - may need additional tissue.  I am discussing with Micro regarding optimal specimen and method for collection.  Explained to Mr. Schmitz that delay was in trying to determine the best possible path forward given implications regarding need for very different treatments.  He verbalized understanding.  All questions answered.

## 2024-06-11 NOTE — REASON FOR VISIT
[Home] : at home, [unfilled] , at the time of the visit. [Medical Office: ([facility])___] : at the medical office located in  [Patient] : the patient [Follow-Up: _____] : a [unfilled] follow-up visit

## 2024-06-11 NOTE — PHYSICAL EXAM
[General Appearance - Alert] : alert [General Appearance - In No Acute Distress] : in no acute distress [General Appearance - Well-Appearing] : healthy appearing [Outer Ear] : the ears and nose were normal in appearance [] : no respiratory distress

## 2024-06-11 NOTE — HISTORY OF PRESENT ILLNESS
[FreeTextEntry1] : 28 year old male with invasive fungal sinusitis s/p ESS on 5/1.  Path c/c invasive fungal sinusitis, fungal culture did not reach lab.  Specimen was sent to [facility] at [place] for ID by sequencing, no result obtained.  Mr. Schmitz called requesting f/u visit.  He reports that he initially felt well after surgery with resolution of his sinus symptoms.  ~1 week ago, he began having nasal congestion after drinking soda or sweet drinks.  He then has light green drainage from his nose. He identifies these symptoms as having "fungus."  He has intermittent fullness in L ear.  No facial pain, fever or chills.

## 2024-06-14 ENCOUNTER — LABORATORY RESULT (OUTPATIENT)
Age: 29
End: 2024-06-14

## 2024-06-14 ENCOUNTER — APPOINTMENT (OUTPATIENT)
Dept: OTOLARYNGOLOGY | Facility: CLINIC | Age: 29
End: 2024-06-14
Payer: COMMERCIAL

## 2024-06-14 VITALS — BODY MASS INDEX: 26.07 KG/M2 | HEIGHT: 68 IN | WEIGHT: 172 LBS

## 2024-06-14 DIAGNOSIS — J32.9 UNSPECIFIED MYCOSIS: ICD-10-CM

## 2024-06-14 DIAGNOSIS — B49 UNSPECIFIED MYCOSIS: ICD-10-CM

## 2024-06-14 PROCEDURE — 99024 POSTOP FOLLOW-UP VISIT: CPT

## 2024-06-14 PROCEDURE — 31237 NSL/SINS NDSC SURG BX POLYPC: CPT | Mod: 58

## 2024-06-14 NOTE — PROCEDURE
[FreeTextEntry3] : Nasal Endoscopy: mild crusting and scab removed nasal airways and middle meati patent, no mucopus, no necrosis  Sinonasal Biopsy: left middle meatus topicalized w afrin/lido several deep biopsies taken w forceps from left retroantral region -> sent for fungal culture as directed hemostasis w afrin tolerated well

## 2024-06-14 NOTE — HISTORY OF PRESENT ILLNESS
[de-identified] : 28M here in postoperative visit s/p ESS (max, ethmoid, sphenoid) with directed biopsies 5/1/24. Intraoperatively, severely sclerotic infiltrative process in left retroantral/infratemporal and ethmoids, and right sphenoethmoid region with purulence in the left sphenoid.  He is doing well since last seen. There is no pain or congestion. He is using budesonide rinses and overall feels markedly better since surgery. He has seen ID. They want additional tissue for fungal identification.  Pathology: 1.  Right maxillary lesion (R/O noplasm, lymphoma, fungal, granulomas): -   Invasive fungal sinusitis, favor Mucormycosis (see note). 2.  Left nasal cavity mass: -   Invasive fungal sinusitis, involving densely hyalinized fibrous tissue with abundant giant cells and focal necrosis (see note). 3.  Right nasal cavity: -   Invasive fungal sinusitis, involving polypoid sinonasal respiratory mucosa with extensive hyalinizing fibrosis, scattered giant cells and brisk mixed inflammatory infiltrate (eosinophils, lymphocytes and plasma cells). 4.  Right middle turbinate: -   Invasive fungal sinusitis, involving polypoid sinonasal tissue with hyalinizing fibrosis, florid giant cell reaction and mixed inflammatory infiltrate (see note). -   Inflammation focally extends into bone. 5.  Right sinus contents: -   Invasive fungal sinusitis, involving respiratory mucosa and hyalinized fibrous tissue with abundant giant cells (see note). 6.  Left infratemporal: -   Hyalinizing fibrosis with scattered giant cells and crushed inflammatory cells. 7.  Bilateral sinus shavings: -  Inflamed sinonasal tissues and necrotic debris admixed with neutrophils and abundant fungus (see note). 8.  Right posterior ethmoid:a -   Hyalinized fibrous tissue with scattered giant cells, consistent with involvement by invasive fungal sinusitis. Note: The many tissue samples submitted appear similar, by GMS revealing abundant fungal hyphae embedded in densely hyalinized fibrous tissue and/or engulfed by numerous multinucleated giant cells.  No well-formed granulomas are seen.  Scant uninvolved respiratory mucosa is also present. GMS highlights tangles of fungal hyphae, ribbons and twisted forms, morphologically favoring Mucorales species. However degeneration precludes reliable elucidation. PAS and AFB stains, non contributory (1FSA,2A,4A,5A,7D). -->Selective blocks will be forwarded for Fungal PCR testing.  ROS otherwise unremarkable.

## 2024-06-14 NOTE — DATA REVIEWED
[de-identified] : MRI 3/2024: FINDINGS:  Evaluation of the dehiscence at the left posterolateral maxillary sinus wall, left medial orbital wall and left orbital floor, right cribriform plate, fovea ethmoidalis, planum sphenoidale and posterior right orbit are better appreciated on CT 3/11/2024.  Near complete opacification with enhancing T1 and T2 hypointense material of the right ethmoid sinuses extending posteriorly into the sphenoid sinus/right anterior clinoid process. Similar enhancing lesion in the left maxillary sinus. There is extension posteriorly into the left  space involving the muscles of mastication and probably the bilateral pterygopalatine fossa. Questionable minimal orbital involvement at the right medial orbital wall and left orbital floor without significant inflammatory change or enhancement in the extraconal fat. Findings may reflect sinonasal malignancy, sinonasal lymphoma, sarcoidosis and granulomatosis with polyangiitis on other etiologies. Biopsy is recommended. Otherwise mild to moderate sinus opacification with mucous retention cysts or secretions or mucosal thickening. Coexistent sinusitis cannot be excluded.  Evaluation of the dehiscence at the left posterolateral maxillary sinus wall, left medial orbital wall and left orbital floor, right cribriform plate, fovea ethmoidalis, planum sphenoidale and posterior right orbit are better appreciated on CT 3/11/2024.  Questionable dural involvement at the site of the dehiscence of the right cribriform plate/fovea ethmoidalis, 107:21 without definite signal abnormality or enhancement in the underlying brain parenchyma. Clinical correlation and dedicated MRI of the brain with and without contrast recommended to exclude intracranial spread or infection  Evaluation of ostiomeatal units is limited by modality and coexistent disease. Leftward nasal septal deviation and spurring inferiorly with mild rightward bowing superiorly is better appreciated on recent CT.  The visualized soft tissues of the nasopharynx, orbits and brain are unremarkable.  The mastoid air cells are normally aerated.  IMPRESSION: 1. Questionable dural involvement at the site of the dehiscence of the right cribriform plate/fovea ethmoidalis, without definite signal abnormality or enhancement in the underlying brain parenchyma. Clinical correlation and dedicated MRI of the brain with and without contrast on a 3 Romina magnet with 1 mm postcontrast T1 and FLAIR images recommended to exclude intracranial spread of infection, inflammation or neoplasm. 2. Near complete opacification with enhancing T1 and T2 hypointense material of the right ethmoid sinuses extending posteriorly into the sphenoid sinus/right anterior clinoid process. Similar enhancing lesion in the left maxillary sinus. There is extension posteriorly into the left  space involving the muscles of mastication and probably the bilateral pterygopalatine fossa. Questionable minimal orbital involvement at the right medial orbital wall and left orbital floor without significant inflammatory change or enhancement in the extraconal fat  CT Sinus 3/2024: Paranasal sinuses: There is polypoid mucosal thickening of the paranasal sinuses with complete opacification of the sphenoid sinuses, ethmoid air cells, and frontal sinuses. There is partial opacification of the left maxillary sinus and mild mucosal thickening of the right maxillary sinus. There is dehiscence of the posterior lateral wall of the left maxilla. The mucosal disease cannot be  from the left pterygoid muscles. There is dehiscence of the medial wall of the left orbit as well as the floor of the left orbit. There is dehiscence cribriform plate, fovea ethmoidalis and the planum sphenoidale on the right. There is dehiscence of the posterior medial wall of the right orbit. There is coalescence of the ethmoid air cells. There is a 1.4 cm hyperdensity posterior left ethmoid air cell which may resent inspissated secretions. There are bilateral medial antrostomies. Air/fluid levels: None. Mastoids: Clear bilaterally.  Orbital soft tissues: Intra orbital soft tissues are intact and symmetric.   Other bones: The nasal bones, zygomatic arches, pterygoid plates and hard palate are intact. The temporal mandibular joints are intact. Nasal septum: Deviated towards the right anteriorly. Nasal cavity & nasopharynx: There is opacification of the right nasal cavity adjacent to the right middle turbinate. This is contiguous with the ethmoid air cells. Brain: No hydrocephalus or midline shift in this limited evaluation.  IMPRESSION: 1. There is extensive polypoid mucosal thickening of the paranasal sinuses with near complete opacification of the sphenoid sinuses, ethmoid air cells and frontal sinuses. There is partial opacification left maxillary sinus and mild mucosal thickening right maxillary sinus. 2. There is dehiscence of the posterior lateral wall of the left maxilla. Soft tissue densities as mucosal disease in the left maxillary sinus cannot be  from the left pterygoid muscles. 3. There is dehiscence of the medial wall of the left orbit as well as the floor of the left orbit. 4. There is dehiscence of the cribriform plate, fovea ethmoidalis and the planum sphenoidale on the right. 5. There is dehiscence of the medial wall of the right orbit posteriorly. 6. There is coalescence of the ethmoid air cells. 7. There are bilateral medial antrostomies. Correlation with prior surgical history is suggested.

## 2024-06-14 NOTE — ASSESSMENT
[FreeTextEntry1] : 28M here in postoperative visit s/p ESS (max, ethmoid, sphenoid) with directed biopsies 5/1/24. Intraoperatively, severely sclerotic infiltrative process in left retroantral/infratemporal and ethmoids, and right sphenoethmoid region with purulence in the left sphenoid. He is doing well since last seen. There is no pain or congestion. He is using budesonide rinses and overall feels markedly better since surgery. He has seen ID. Though pathology is c/w chronic invasive fungal sinusitis, additional tissue is needed for fungal identification. On exam, nasal endoscopy shows expected postoperative changes. Additional sinonasal biopsies sent from left retroantral region and sent accordingly. This case is very unique in its presentation and to the degree and extent of irregular, infiltrative and sclerotic disease throughout both separate lesions. Pathology is c/w chronic invasive fungal sinusitis. More tissue was sent today. For now, he will continue budesonide rinses. Continue f/u with ID to start aggressive, prolonged cx-directed antifungal treatment which will be the next step. RTO 4 weeks.

## 2024-06-21 RX ORDER — VORICONAZOLE 200 MG/1
200 TABLET ORAL
Qty: 60 | Refills: 0 | Status: ACTIVE | COMMUNITY
Start: 2024-06-21 | End: 1900-01-01

## 2024-07-09 ENCOUNTER — APPOINTMENT (OUTPATIENT)
Dept: INFECTIOUS DISEASE | Facility: CLINIC | Age: 29
End: 2024-07-09
Payer: COMMERCIAL

## 2024-07-09 VITALS
BODY MASS INDEX: 25.73 KG/M2 | DIASTOLIC BLOOD PRESSURE: 66 MMHG | OXYGEN SATURATION: 98 % | HEIGHT: 68 IN | HEART RATE: 53 BPM | SYSTOLIC BLOOD PRESSURE: 108 MMHG | TEMPERATURE: 97.3 F | WEIGHT: 169.76 LBS

## 2024-07-09 PROCEDURE — 99214 OFFICE O/P EST MOD 30 MIN: CPT

## 2024-07-09 PROCEDURE — G2211 COMPLEX E/M VISIT ADD ON: CPT | Mod: NC,1L

## 2024-07-09 PROCEDURE — 36415 COLL VENOUS BLD VENIPUNCTURE: CPT

## 2024-07-10 LAB
ALBUMIN SERPL ELPH-MCNC: 4.7 G/DL
ALT SERPL-CCNC: 17 U/L
ANION GAP SERPL CALC-SCNC: 11 MMOL/L
BASOPHILS # BLD AUTO: 0.08 K/UL
BASOPHILS NFR BLD AUTO: 1.4 %
BILIRUB SERPL-MCNC: 0.4 MG/DL
BUN SERPL-MCNC: 8 MG/DL
CALCIUM SERPL-MCNC: 9.5 MG/DL
CHLORIDE SERPL-SCNC: 102 MMOL/L
CO2 SERPL-SCNC: 25 MMOL/L
CREAT SERPL-MCNC: 0.93 MG/DL
EGFR: 115 ML/MIN/1.73M2
EOSINOPHIL # BLD AUTO: 0.25 K/UL
EOSINOPHIL NFR BLD AUTO: 4.4 %
GLUCOSE SERPL-MCNC: 90 MG/DL
HCT VFR BLD CALC: 45.2 %
HGB BLD-MCNC: 13.6 G/DL
IMM GRANULOCYTES NFR BLD AUTO: 0.2 %
LYMPHOCYTES # BLD AUTO: 2.02 K/UL
LYMPHOCYTES NFR BLD AUTO: 35.4 %
MAN DIFF?: NORMAL
MCHC RBC-ENTMCNC: 26.3 PG
MCV RBC AUTO: 87.3 FL
MONOCYTES # BLD AUTO: 0.55 K/UL
MONOCYTES NFR BLD AUTO: 9.6 %
NEUTROPHILS # BLD AUTO: 2.79 K/UL
NEUTROPHILS NFR BLD AUTO: 49 %
PLATELET # BLD AUTO: 335 K/UL
POTASSIUM SERPL-SCNC: 4.7 MMOL/L
RBC # BLD: 5.18 M/UL
RBC # FLD: 13.8 %
SODIUM SERPL-SCNC: 139 MMOL/L
WBC # FLD AUTO: 5.7 K/UL

## 2024-07-17 ENCOUNTER — NON-APPOINTMENT (OUTPATIENT)
Age: 29
End: 2024-07-17

## 2024-07-17 DIAGNOSIS — B49 UNSPECIFIED MYCOSIS: ICD-10-CM

## 2024-07-17 DIAGNOSIS — J32.9 UNSPECIFIED MYCOSIS: ICD-10-CM

## 2024-07-17 RX ORDER — VORICONAZOLE 50 MG/1
50 TABLET ORAL
Qty: 120 | Refills: 1 | Status: ACTIVE | COMMUNITY
Start: 2024-07-17 | End: 1900-01-01

## 2024-07-29 ENCOUNTER — NON-APPOINTMENT (OUTPATIENT)
Age: 29
End: 2024-07-29

## 2024-08-13 ENCOUNTER — APPOINTMENT (OUTPATIENT)
Dept: INFECTIOUS DISEASE | Facility: CLINIC | Age: 29
End: 2024-08-13
Payer: COMMERCIAL

## 2024-08-13 VITALS
OXYGEN SATURATION: 98 % | HEART RATE: 72 BPM | SYSTOLIC BLOOD PRESSURE: 127 MMHG | DIASTOLIC BLOOD PRESSURE: 76 MMHG | WEIGHT: 164 LBS | HEIGHT: 68 IN | TEMPERATURE: 97.6 F | BODY MASS INDEX: 24.86 KG/M2

## 2024-08-13 DIAGNOSIS — B49 OTHER ALLERGIC RHINITIS: ICD-10-CM

## 2024-08-13 DIAGNOSIS — J30.89 OTHER ALLERGIC RHINITIS: ICD-10-CM

## 2024-08-13 PROCEDURE — 36415 COLL VENOUS BLD VENIPUNCTURE: CPT

## 2024-08-13 PROCEDURE — 99214 OFFICE O/P EST MOD 30 MIN: CPT

## 2024-08-13 NOTE — HISTORY OF PRESENT ILLNESS
[FreeTextEntry1] : 27 yo M with recurrent fungal sinusitis, present process invasive, s/p ESS (max, ethmoid and sphenoid) on 5/1, intraoperatively was found to have severely sclerotic infiltrative process in L retroantral/infratemporal and ethmoids, and R spheno-ethmoid region with purulence in L sphenoid. Path showed hyalinizing fibrosis, giant cell reaction and mixed inflammatory infiltrate, GMS stains c/c invasive fungal sinusitis, concern for Mucor based upon fungal morphology on GMS-stained tissue. Attempt at sequencing by [facility] ([facility]) unsuccessful. Serum b-D-glucan positive on 5/17 and 5/27, GM neg. Aspergillus Abs 5/17 were positive for A. fumigatus (1:2) and A. flavus (1:1), serum total IgE 1045 (<=100), T cell subsets WNL. On 6/14, he underwent nasal endoscopy, additional biopsies taken from L retroantral region. Fungal cultures (tissue and body fluid) grew Aspergillus flavus, susceptible to vori (JR 0.25).  He started taking voriconazole ~7/4, initially 200 mg po q12h, trough at last visit on 7/9 was 0.2 - was ~1 h late.  Dose was increased to 300 mg po q12h.  He did not start higher dose until 7-10 d ago.  He had called on 7/29 with erythema under eyes X few d that was not progressive.  We discussed potential photosensitivity related to vori.  He overall feels well.  When he eats alot of sugar, he feels heaviness in his ears.  No sinus pressure or pain, no difficulty breathing.  He is using nasal spray and doing rinses 1-2 X/d.  After taking vori, he has lightheadedness and sees halos around lights for ~15 min.  He still has periorbital puffiness that is unchanged.

## 2024-08-13 NOTE — ASSESSMENT
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  Reason for mild infra-orbital edema is unclear.  He has f/u with ENT (Dr. Cao) on 8/15. Plan: - CBC, CMP, voriconazole trough - drawn and sent from office (drawn at ~10 am, prior dose 8 pm) - Continue voriconazole 300 mg po q12h. Stressed need to take q12h, not bid - F/U with Dr. Cao as planned Will call with results. F/U 1 month. He was encouraged to call with questions/concerns.

## 2024-08-13 NOTE — REVIEW OF SYSTEMS
[As Noted in HPI] : as noted in HPI [Fever] : no fever [Chills] : no chills [Eye Pain] : no eye pain [Chest Pain] : no chest pain [Shortness Of Breath] : no shortness of breath [Wheezing] : no wheezing [Cough] : no cough [Sputum] : not coughing up ~M sputum [Abdominal Pain] : no abdominal pain [Vomiting] : no vomiting [Diarrhea] : no diarrhea [Dysuria] : no dysuria [Joint Pain] : no joint pain [Skin Lesions] : no skin lesions

## 2024-08-13 NOTE — PHYSICAL EXAM
[General Appearance - Alert] : alert [General Appearance - In No Acute Distress] : in no acute distress [General Appearance - Well-Appearing] : healthy appearing [Sclera] : the sclera and conjunctiva were normal [PERRL With Normal Accommodation] : pupils were equal in size, round, reactive to light [Extraocular Movements] : extraocular movements were intact [Outer Ear] : the ears and nose were normal in appearance [Examination Of The Oral Cavity] : the lips and gums were normal [Oropharynx] : the oropharynx was normal with no thrush [Auscultation Breath Sounds / Voice Sounds] : lungs were clear to auscultation bilaterally [Heart Rate And Rhythm] : heart rate was normal and rhythm regular [Heart Sounds] : normal S1 and S2 [Murmurs] : no murmurs [Edema] : there was no peripheral edema [Bowel Sounds] : normal bowel sounds [Abdomen Soft] : soft [Abdomen Tenderness] : non-tender [Costovertebral Angle Tenderness] : no CVA tenderness [No Palpable Adenopathy] : no palpable adenopathy [Musculoskeletal - Swelling] : no joint swelling [Skin Color & Pigmentation] : normal skin color and pigmentation [] : no rash [FreeTextEntry1] : No sinus tenderness to palpation

## 2024-08-14 LAB
ALBUMIN SERPL ELPH-MCNC: 4.6 G/DL
ALP BLD-CCNC: 86 U/L
ALT SERPL-CCNC: 19 U/L
ANION GAP SERPL CALC-SCNC: 12 MMOL/L
AST SERPL-CCNC: 19 U/L
BASOPHILS # BLD AUTO: 0.06 K/UL
BASOPHILS NFR BLD AUTO: 1 %
BILIRUB SERPL-MCNC: 0.2 MG/DL
BUN SERPL-MCNC: 8 MG/DL
CALCIUM SERPL-MCNC: 9.3 MG/DL
CHLORIDE SERPL-SCNC: 103 MMOL/L
CO2 SERPL-SCNC: 24 MMOL/L
CREAT SERPL-MCNC: 0.86 MG/DL
CRP SERPL-MCNC: <3 MG/L
EGFR: 120 ML/MIN/1.73M2
EOSINOPHIL # BLD AUTO: 0.3 K/UL
EOSINOPHIL NFR BLD AUTO: 5.2 %
ERYTHROCYTE [SEDIMENTATION RATE] IN BLOOD BY WESTERGREN METHOD: 10 MM/HR
GLUCOSE SERPL-MCNC: 114 MG/DL
HCT VFR BLD CALC: 39.9 %
HGB BLD-MCNC: 12.8 G/DL
IMM GRANULOCYTES NFR BLD AUTO: 0.2 %
LYMPHOCYTES # BLD AUTO: 1.71 K/UL
LYMPHOCYTES NFR BLD AUTO: 29.6 %
MAN DIFF?: NORMAL
MCHC RBC-ENTMCNC: 26.8 PG
MCHC RBC-ENTMCNC: 32.1 GM/DL
MCV RBC AUTO: 83.5 FL
MONOCYTES # BLD AUTO: 0.46 K/UL
MONOCYTES NFR BLD AUTO: 8 %
NEUTROPHILS # BLD AUTO: 3.23 K/UL
NEUTROPHILS NFR BLD AUTO: 56 %
PLATELET # BLD AUTO: 265 K/UL
POTASSIUM SERPL-SCNC: 4.4 MMOL/L
PROT SERPL-MCNC: 7.5 G/DL
RBC # BLD: 4.78 M/UL
RBC # FLD: 13.5 %
SODIUM SERPL-SCNC: 139 MMOL/L
WBC # FLD AUTO: 5.77 K/UL

## 2024-08-16 ENCOUNTER — NON-APPOINTMENT (OUTPATIENT)
Age: 29
End: 2024-08-16

## 2024-08-16 DIAGNOSIS — B49 UNSPECIFIED MYCOSIS: ICD-10-CM

## 2024-08-16 DIAGNOSIS — J32.9 UNSPECIFIED MYCOSIS: ICD-10-CM

## 2024-08-16 LAB — VORICONAZOLE SERPL-MCNC: 0.1 MCG/ML

## 2024-08-23 ENCOUNTER — NON-APPOINTMENT (OUTPATIENT)
Age: 29
End: 2024-08-23

## 2024-09-13 ENCOUNTER — APPOINTMENT (OUTPATIENT)
Dept: OTOLARYNGOLOGY | Facility: CLINIC | Age: 29
End: 2024-09-13
Payer: COMMERCIAL

## 2024-09-13 DIAGNOSIS — B49 UNSPECIFIED MYCOSIS: ICD-10-CM

## 2024-09-13 DIAGNOSIS — J32.4 CHRONIC PANSINUSITIS: ICD-10-CM

## 2024-09-13 DIAGNOSIS — J32.9 UNSPECIFIED MYCOSIS: ICD-10-CM

## 2024-09-13 PROCEDURE — 92550 TYMPANOMETRY & REFLEX THRESH: CPT | Mod: 52

## 2024-09-13 PROCEDURE — 92557 COMPREHENSIVE HEARING TEST: CPT

## 2024-09-13 PROCEDURE — 31231 NASAL ENDOSCOPY DX: CPT

## 2024-09-13 PROCEDURE — 99213 OFFICE O/P EST LOW 20 MIN: CPT | Mod: 25

## 2024-09-13 NOTE — PHYSICAL EXAM
[Nasal Endoscopy Performed] : nasal endoscopy was performed, see procedure section for findings [Midline] : trachea located in midline position [Normal] : tympanic membranes are normal in both ears [de-identified] : eac clear, tm intact and mobile, me clear

## 2024-09-13 NOTE — PROCEDURE
[FreeTextEntry3] : Nasal Endoscopy: nasal airways and paranasal sinuses widely patent, no mucopus, no necrosis

## 2024-09-13 NOTE — DATA REVIEWED
[de-identified] : MRI 3/2024: FINDINGS:  Evaluation of the dehiscence at the left posterolateral maxillary sinus wall, left medial orbital wall and left orbital floor, right cribriform plate, fovea ethmoidalis, planum sphenoidale and posterior right orbit are better appreciated on CT 3/11/2024.  Near complete opacification with enhancing T1 and T2 hypointense material of the right ethmoid sinuses extending posteriorly into the sphenoid sinus/right anterior clinoid process. Similar enhancing lesion in the left maxillary sinus. There is extension posteriorly into the left  space involving the muscles of mastication and probably the bilateral pterygopalatine fossa. Questionable minimal orbital involvement at the right medial orbital wall and left orbital floor without significant inflammatory change or enhancement in the extraconal fat. Findings may reflect sinonasal malignancy, sinonasal lymphoma, sarcoidosis and granulomatosis with polyangiitis on other etiologies. Biopsy is recommended. Otherwise mild to moderate sinus opacification with mucous retention cysts or secretions or mucosal thickening. Coexistent sinusitis cannot be excluded.  Evaluation of the dehiscence at the left posterolateral maxillary sinus wall, left medial orbital wall and left orbital floor, right cribriform plate, fovea ethmoidalis, planum sphenoidale and posterior right orbit are better appreciated on CT 3/11/2024.  Questionable dural involvement at the site of the dehiscence of the right cribriform plate/fovea ethmoidalis, 107:21 without definite signal abnormality or enhancement in the underlying brain parenchyma. Clinical correlation and dedicated MRI of the brain with and without contrast recommended to exclude intracranial spread or infection  Evaluation of ostiomeatal units is limited by modality and coexistent disease. Leftward nasal septal deviation and spurring inferiorly with mild rightward bowing superiorly is better appreciated on recent CT.  The visualized soft tissues of the nasopharynx, orbits and brain are unremarkable.  The mastoid air cells are normally aerated.  IMPRESSION: 1. Questionable dural involvement at the site of the dehiscence of the right cribriform plate/fovea ethmoidalis, without definite signal abnormality or enhancement in the underlying brain parenchyma. Clinical correlation and dedicated MRI of the brain with and without contrast on a 3 Romina magnet with 1 mm postcontrast T1 and FLAIR images recommended to exclude intracranial spread of infection, inflammation or neoplasm. 2. Near complete opacification with enhancing T1 and T2 hypointense material of the right ethmoid sinuses extending posteriorly into the sphenoid sinus/right anterior clinoid process. Similar enhancing lesion in the left maxillary sinus. There is extension posteriorly into the left  space involving the muscles of mastication and probably the bilateral pterygopalatine fossa. Questionable minimal orbital involvement at the right medial orbital wall and left orbital floor without significant inflammatory change or enhancement in the extraconal fat  CT Sinus 3/2024: Paranasal sinuses: There is polypoid mucosal thickening of the paranasal sinuses with complete opacification of the sphenoid sinuses, ethmoid air cells, and frontal sinuses. There is partial opacification of the left maxillary sinus and mild mucosal thickening of the right maxillary sinus. There is dehiscence of the posterior lateral wall of the left maxilla. The mucosal disease cannot be  from the left pterygoid muscles. There is dehiscence of the medial wall of the left orbit as well as the floor of the left orbit. There is dehiscence cribriform plate, fovea ethmoidalis and the planum sphenoidale on the right. There is dehiscence of the posterior medial wall of the right orbit. There is coalescence of the ethmoid air cells. There is a 1.4 cm hyperdensity posterior left ethmoid air cell which may resent inspissated secretions. There are bilateral medial antrostomies. Air/fluid levels: None. Mastoids: Clear bilaterally.  Orbital soft tissues: Intra orbital soft tissues are intact and symmetric.   Other bones: The nasal bones, zygomatic arches, pterygoid plates and hard palate are intact. The temporal mandibular joints are intact. Nasal septum: Deviated towards the right anteriorly. Nasal cavity & nasopharynx: There is opacification of the right nasal cavity adjacent to the right middle turbinate. This is contiguous with the ethmoid air cells. Brain: No hydrocephalus or midline shift in this limited evaluation.  IMPRESSION: 1. There is extensive polypoid mucosal thickening of the paranasal sinuses with near complete opacification of the sphenoid sinuses, ethmoid air cells and frontal sinuses. There is partial opacification left maxillary sinus and mild mucosal thickening right maxillary sinus. 2. There is dehiscence of the posterior lateral wall of the left maxilla. Soft tissue densities as mucosal disease in the left maxillary sinus cannot be  from the left pterygoid muscles. 3. There is dehiscence of the medial wall of the left orbit as well as the floor of the left orbit. 4. There is dehiscence of the cribriform plate, fovea ethmoidalis and the planum sphenoidale on the right. 5. There is dehiscence of the medial wall of the right orbit posteriorly. 6. There is coalescence of the ethmoid air cells. 7. There are bilateral medial antrostomies. Correlation with prior surgical history is suggested.

## 2024-09-13 NOTE — HISTORY OF PRESENT ILLNESS
[de-identified] : 29M here in postoperative visit s/p ESS (max, ethmoid, sphenoid) with directed biopsies 5/1/24. Intraoperatively, severely sclerotic infiltrative process in left retroantral/infratemporal and ethmoids, and right sphenoethmoid region with purulence in the left sphenoid.  He is doing well since last seen 3 months ago. There is no pain or congestion. He is using budesonide rinses and overall feels markedly better since surgery. He remains on voriconazole as per ID. Over the past few days he feels fullness/congestion in the right ear which he feels started after rinsing her nose.  Audiogram from today - normal hearing w small conductive component right ear, type A tymps  ROS otherwise unremarkable.

## 2024-09-13 NOTE — ASSESSMENT
[FreeTextEntry1] : 29M here in postoperative visit s/p ESS (max, ethmoid, sphenoid) with directed biopsies 5/1/24. Intraoperatively, severely sclerotic infiltrative process in left retroantral/infratemporal and ethmoids, and right sphenoethmoid region with purulence in the left sphenoid. He is doing well since last seen 3 months ago. There is no pain or congestion. He is using budesonide rinses and overall feels markedly better since surgery. He remains on voriconazole as per ID. Over the past few days he feels fullness/congestion in the right ear which he feels started after rinsing her nose. Audiogram from is unremarkable. On exam, nasal endoscopy shows well healed postoperative changes.  Chronic invasive fungal sinusitis. He is doing very well at this point. Endoscopy looks the best I have seen. For now, continue nasal rinses. Cont ID followup and antifungals. RTO 3 months.

## 2024-09-13 NOTE — CONSULT LETTER
[Dear  ___] : Dear  [unfilled], [Courtesy Letter:] : I had the pleasure of seeing your patient, [unfilled], in my office today. [Consult Closing:] : Thank you very much for allowing me to participate in the care of this patient.  If you have any questions, please do not hesitate to contact me. [Sincerely,] : Sincerely, [DrAj  ___] : Dr. BACK [FreeTextEntry3] : Gatito Cao MD Department of Otolaryngology - [facility]

## 2024-09-24 ENCOUNTER — APPOINTMENT (OUTPATIENT)
Dept: OTOLARYNGOLOGY | Facility: CLINIC | Age: 29
End: 2024-09-24

## 2024-09-24 ENCOUNTER — APPOINTMENT (OUTPATIENT)
Dept: INFECTIOUS DISEASE | Facility: CLINIC | Age: 29
End: 2024-09-24

## 2024-09-27 ENCOUNTER — APPOINTMENT (OUTPATIENT)
Dept: INFECTIOUS DISEASE | Facility: CLINIC | Age: 29
End: 2024-09-27
Payer: COMMERCIAL

## 2024-09-27 VITALS
TEMPERATURE: 97.9 F | HEART RATE: 53 BPM | SYSTOLIC BLOOD PRESSURE: 130 MMHG | OXYGEN SATURATION: 99 % | WEIGHT: 171.96 LBS | HEIGHT: 68 IN | DIASTOLIC BLOOD PRESSURE: 77 MMHG | BODY MASS INDEX: 26.06 KG/M2

## 2024-09-27 DIAGNOSIS — J32.9 UNSPECIFIED MYCOSIS: ICD-10-CM

## 2024-09-27 DIAGNOSIS — B49 UNSPECIFIED MYCOSIS: ICD-10-CM

## 2024-09-27 PROCEDURE — 99214 OFFICE O/P EST MOD 30 MIN: CPT

## 2024-09-27 NOTE — HISTORY OF PRESENT ILLNESS
[FreeTextEntry1] : 27 yo M with recurrent fungal sinusitis, present process invasive, s/p ESS (max, ethmoid and sphenoid) on 5/1, intraoperatively was found to have severely sclerotic infiltrative process in L retroantral/infratemporal and ethmoids, and R spheno-ethmoid region with purulence in L sphenoid. Path showed hyalinizing fibrosis, giant cell reaction and mixed inflammatory infiltrate, GMS stains c/c invasive fungal sinusitis, concern for Mucor based upon fungal morphology on GMS-stained tissue. Attempt at sequencing by [facility] ([facility]) unsuccessful. Serum b-D-glucan positive on 5/17 and 5/27, GM neg. Aspergillus Abs 5/17 were positive for A. fumigatus (1:2) and A. flavus (1:1), serum total IgE 1045 (<=100), T cell subsets WNL. On 6/14, he underwent nasal endoscopy, additional biopsies taken from L retroantral region. Fungal cultures (tissue and body fluid) grew Aspergillus flavus, susceptible to vori (JR 0.25). He started taking voriconazole ~7/4, initially 200 mg po q12h, with subtherapeutic trough, increased to 300 mg po q12h in early August, still with subtherapeutic trough.  Dose was increased to 400 mg po q12h on 8/20.  He subsequently experienced insomnia that then resolved.  He ran out of voriconazole ~1 week ago - he is waiting to  from pharmacy.  He is experiencing intermittent lightheadedness when he turns his head rapidly lasting 1-2 sec but otherwise feels well. Is using nasal rinses once daily.  No fever, chills, rhinorrhea, facial pain. He has ongoing mild infraorbital puffiness.

## 2024-09-27 NOTE — REVIEW OF SYSTEMS
[As Noted in HPI] : as noted in HPI [Loss Of Hearing] : no hearing loss [Sore Throat] : no sore throat [Chest Pain] : no chest pain [Lower Ext Edema] : no extremity edema [Shortness Of Breath] : no shortness of breath [Cough] : no cough [Sputum] : not coughing up ~M sputum [Abdominal Pain] : no abdominal pain [Vomiting] : no vomiting [Diarrhea] : no diarrhea [Dysuria] : no dysuria [Joint Pain] : no joint pain [Skin Lesions] : no skin lesions

## 2024-09-27 NOTE — ASSESSMENT
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 He had had subtherapeutic troughs and now has been off voriconazole for a week.  We discussed need to take consistently,  Plan: - Restart voriconazole 400 mg po q12h ASAP - ~one week after restarting, return to office for blood draw:  CBC with diff, CMP, voriconazole trough.  We discussed that timing should be one hour before dose is due - with teachback.  Will call with results F/U 6 weeks.  He was encouraged to call with questions/concerns.

## 2024-09-27 NOTE — PHYSICAL EXAM
[General Appearance - Alert] : alert [General Appearance - In No Acute Distress] : in no acute distress [General Appearance - Well-Appearing] : healthy appearing [Sclera] : the sclera and conjunctiva were normal [PERRL With Normal Accommodation] : pupils were equal in size, round, reactive to light [Extraocular Movements] : extraocular movements were intact [Outer Ear] : the ears and nose were normal in appearance [Examination Of The Oral Cavity] : the lips and gums were normal [Oropharynx] : the oropharynx was normal with no thrush [Auscultation Breath Sounds / Voice Sounds] : lungs were clear to auscultation bilaterally [Heart Rate And Rhythm] : heart rate was normal and rhythm regular [Heart Sounds] : normal S1 and S2 [Murmurs] : no murmurs [Edema] : there was no peripheral edema [Abdomen Soft] : soft [Bowel Sounds] : normal bowel sounds [Abdomen Tenderness] : non-tender [Costovertebral Angle Tenderness] : no CVA tenderness [No Palpable Adenopathy] : no palpable adenopathy [Musculoskeletal - Swelling] : no joint swelling [Skin Color & Pigmentation] : normal skin color and pigmentation [] : no rash [FreeTextEntry1] : No sinus tenderness to palpation

## 2024-10-14 ENCOUNTER — NON-APPOINTMENT (OUTPATIENT)
Age: 29
End: 2024-10-14

## 2024-10-15 DIAGNOSIS — J32.9 UNSPECIFIED MYCOSIS: ICD-10-CM

## 2024-10-15 DIAGNOSIS — B49 UNSPECIFIED MYCOSIS: ICD-10-CM

## 2024-10-28 ENCOUNTER — LABORATORY RESULT (OUTPATIENT)
Age: 29
End: 2024-10-28

## 2024-10-29 ENCOUNTER — LABORATORY RESULT (OUTPATIENT)
Age: 29
End: 2024-10-29

## 2024-11-08 ENCOUNTER — APPOINTMENT (OUTPATIENT)
Dept: INFECTIOUS DISEASE | Facility: CLINIC | Age: 29
End: 2024-11-08

## 2024-11-12 ENCOUNTER — NON-APPOINTMENT (OUTPATIENT)
Age: 29
End: 2024-11-12

## 2024-11-12 ENCOUNTER — APPOINTMENT (OUTPATIENT)
Dept: INFECTIOUS DISEASE | Facility: CLINIC | Age: 29
End: 2024-11-12
Payer: COMMERCIAL

## 2024-11-12 VITALS
HEIGHT: 68 IN | HEART RATE: 62 BPM | BODY MASS INDEX: 26.52 KG/M2 | TEMPERATURE: 98.2 F | SYSTOLIC BLOOD PRESSURE: 137 MMHG | WEIGHT: 175 LBS | OXYGEN SATURATION: 98 % | DIASTOLIC BLOOD PRESSURE: 96 MMHG

## 2024-11-12 DIAGNOSIS — J32.9 UNSPECIFIED MYCOSIS: ICD-10-CM

## 2024-11-12 DIAGNOSIS — B49 UNSPECIFIED MYCOSIS: ICD-10-CM

## 2024-11-12 PROCEDURE — 36415 COLL VENOUS BLD VENIPUNCTURE: CPT

## 2024-11-12 PROCEDURE — 99214 OFFICE O/P EST MOD 30 MIN: CPT

## 2024-11-12 RX ORDER — POSACONAZOLE 300 MG/1
300 POWDER, FOR SUSPENSION ORAL
Qty: 31 | Refills: 2 | Status: ACTIVE | COMMUNITY
Start: 2024-11-12 | End: 1900-01-01

## 2024-11-13 LAB
ALBUMIN SERPL ELPH-MCNC: 4.7 G/DL
ALP BLD-CCNC: 90 U/L
ALT SERPL-CCNC: 23 U/L
ANION GAP SERPL CALC-SCNC: 13 MMOL/L
AST SERPL-CCNC: 23 U/L
BASOPHILS # BLD AUTO: 0.07 K/UL
BASOPHILS NFR BLD AUTO: 1 %
BILIRUB SERPL-MCNC: 0.3 MG/DL
BUN SERPL-MCNC: 10 MG/DL
CALCIUM SERPL-MCNC: 9.9 MG/DL
CHLORIDE SERPL-SCNC: 103 MMOL/L
CO2 SERPL-SCNC: 25 MMOL/L
CREAT SERPL-MCNC: 0.88 MG/DL
EGFR: 119 ML/MIN/1.73M2
EOSINOPHIL # BLD AUTO: 0.24 K/UL
EOSINOPHIL NFR BLD AUTO: 3.5 %
GLUCOSE SERPL-MCNC: 80 MG/DL
HCT VFR BLD CALC: 41.5 %
HGB BLD-MCNC: 13.6 G/DL
IMM GRANULOCYTES NFR BLD AUTO: 0.3 %
LYMPHOCYTES # BLD AUTO: 2.08 K/UL
LYMPHOCYTES NFR BLD AUTO: 30.6 %
MAN DIFF?: NORMAL
MCHC RBC-ENTMCNC: 27.5 PG
MCHC RBC-ENTMCNC: 32.8 G/DL
MCV RBC AUTO: 83.8 FL
MONOCYTES # BLD AUTO: 0.72 K/UL
MONOCYTES NFR BLD AUTO: 10.6 %
NEUTROPHILS # BLD AUTO: 3.67 K/UL
NEUTROPHILS NFR BLD AUTO: 54 %
PLATELET # BLD AUTO: 253 K/UL
POTASSIUM SERPL-SCNC: 4.4 MMOL/L
PROT SERPL-MCNC: 7.5 G/DL
RBC # BLD: 4.95 M/UL
RBC # FLD: 13.2 %
SODIUM SERPL-SCNC: 140 MMOL/L
WBC # FLD AUTO: 6.8 K/UL

## 2024-11-14 ENCOUNTER — NON-APPOINTMENT (OUTPATIENT)
Age: 29
End: 2024-11-14

## 2024-12-13 ENCOUNTER — NON-APPOINTMENT (OUTPATIENT)
Age: 29
End: 2024-12-13

## 2024-12-19 ENCOUNTER — NON-APPOINTMENT (OUTPATIENT)
Age: 29
End: 2024-12-19

## 2024-12-31 ENCOUNTER — APPOINTMENT (OUTPATIENT)
Dept: INFECTIOUS DISEASE | Facility: CLINIC | Age: 29
End: 2024-12-31

## 2025-02-11 ENCOUNTER — APPOINTMENT (OUTPATIENT)
Dept: INFECTIOUS DISEASE | Facility: CLINIC | Age: 30
End: 2025-02-11
Payer: COMMERCIAL

## 2025-02-11 VITALS
OXYGEN SATURATION: 75 % | HEART RATE: 65 BPM | DIASTOLIC BLOOD PRESSURE: 68 MMHG | HEIGHT: 68 IN | SYSTOLIC BLOOD PRESSURE: 111 MMHG | WEIGHT: 170 LBS | TEMPERATURE: 98.7 F | BODY MASS INDEX: 25.76 KG/M2

## 2025-02-11 PROCEDURE — 99214 OFFICE O/P EST MOD 30 MIN: CPT | Mod: 25

## 2025-02-11 RX ORDER — VORICONAZOLE 200 MG/1
200 TABLET ORAL
Qty: 120 | Refills: 0 | Status: ACTIVE | COMMUNITY
Start: 2025-02-11 | End: 1900-01-01

## 2025-02-12 DIAGNOSIS — J32.9 UNSPECIFIED MYCOSIS: ICD-10-CM

## 2025-02-12 DIAGNOSIS — B49 UNSPECIFIED MYCOSIS: ICD-10-CM

## 2025-02-12 LAB
ALBUMIN SERPL ELPH-MCNC: 4.6 G/DL
ALP BLD-CCNC: 87 U/L
ALT SERPL-CCNC: 31 U/L
ANION GAP SERPL CALC-SCNC: 12 MMOL/L
AST SERPL-CCNC: 35 U/L
BILIRUB SERPL-MCNC: 0.4 MG/DL
BUN SERPL-MCNC: 11 MG/DL
CALCIUM SERPL-MCNC: 9.6 MG/DL
CHLORIDE SERPL-SCNC: 104 MMOL/L
CO2 SERPL-SCNC: 24 MMOL/L
CREAT SERPL-MCNC: 0.83 MG/DL
EGFR: 122 ML/MIN/1.73M2
GLUCOSE SERPL-MCNC: 76 MG/DL
POTASSIUM SERPL-SCNC: 4.5 MMOL/L
PROT SERPL-MCNC: 7.4 G/DL
SODIUM SERPL-SCNC: 140 MMOL/L

## 2025-02-13 ENCOUNTER — NON-APPOINTMENT (OUTPATIENT)
Age: 30
End: 2025-02-13

## 2025-02-14 LAB — VORICONAZOLE SERPL-MCNC: 0.3 MCG/ML

## 2025-02-28 ENCOUNTER — NON-APPOINTMENT (OUTPATIENT)
Age: 30
End: 2025-02-28

## 2025-06-03 ENCOUNTER — NON-APPOINTMENT (OUTPATIENT)
Age: 30
End: 2025-06-03

## 2025-06-05 ENCOUNTER — APPOINTMENT (OUTPATIENT)
Dept: OTOLARYNGOLOGY | Facility: CLINIC | Age: 30
End: 2025-06-05
Payer: COMMERCIAL

## 2025-06-05 DIAGNOSIS — B49 UNSPECIFIED MYCOSIS: ICD-10-CM

## 2025-06-05 DIAGNOSIS — J32.9 UNSPECIFIED MYCOSIS: ICD-10-CM

## 2025-06-05 PROCEDURE — 99214 OFFICE O/P EST MOD 30 MIN: CPT | Mod: 25

## 2025-06-05 PROCEDURE — 31231 NASAL ENDOSCOPY DX: CPT

## 2025-06-10 ENCOUNTER — APPOINTMENT (OUTPATIENT)
Dept: INFECTIOUS DISEASE | Facility: CLINIC | Age: 30
End: 2025-06-10

## 2025-06-17 ENCOUNTER — APPOINTMENT (OUTPATIENT)
Dept: INFECTIOUS DISEASE | Facility: CLINIC | Age: 30
End: 2025-06-17

## 2025-07-08 ENCOUNTER — APPOINTMENT (OUTPATIENT)
Dept: INFECTIOUS DISEASE | Facility: CLINIC | Age: 30
End: 2025-07-08
Payer: COMMERCIAL

## 2025-07-08 VITALS
HEART RATE: 47 BPM | DIASTOLIC BLOOD PRESSURE: 87 MMHG | TEMPERATURE: 98.1 F | WEIGHT: 173 LBS | HEIGHT: 68 IN | OXYGEN SATURATION: 98 % | BODY MASS INDEX: 26.22 KG/M2 | SYSTOLIC BLOOD PRESSURE: 132 MMHG

## 2025-07-08 PROCEDURE — 99214 OFFICE O/P EST MOD 30 MIN: CPT | Mod: 25

## 2025-07-09 LAB
ALBUMIN SERPL ELPH-MCNC: 4.8 G/DL
ALP BLD-CCNC: 108 U/L
ALT SERPL-CCNC: 27 U/L
ANION GAP SERPL CALC-SCNC: 11 MMOL/L
AST SERPL-CCNC: 24 U/L
BASOPHILS # BLD AUTO: 0.06 K/UL
BASOPHILS NFR BLD AUTO: 1.1 %
BILIRUB SERPL-MCNC: 0.3 MG/DL
BUN SERPL-MCNC: 8 MG/DL
CALCIUM SERPL-MCNC: 9.6 MG/DL
CHLORIDE SERPL-SCNC: 104 MMOL/L
CO2 SERPL-SCNC: 25 MMOL/L
CREAT SERPL-MCNC: 0.86 MG/DL
CRP SERPL-MCNC: <3 MG/L
EGFRCR SERPLBLD CKD-EPI 2021: 120 ML/MIN/1.73M2
EOSINOPHIL # BLD AUTO: 0.21 K/UL
EOSINOPHIL NFR BLD AUTO: 3.8 %
ERYTHROCYTE [SEDIMENTATION RATE] IN BLOOD BY WESTERGREN METHOD: 5 MM/HR
GLUCOSE SERPL-MCNC: 95 MG/DL
HCT VFR BLD CALC: 46.1 %
HGB BLD-MCNC: 13.7 G/DL
IMM GRANULOCYTES NFR BLD AUTO: 0.4 %
LYMPHOCYTES # BLD AUTO: 1.87 K/UL
LYMPHOCYTES NFR BLD AUTO: 34.1 %
MAN DIFF?: NORMAL
MCHC RBC-ENTMCNC: 27.4 PG
MCHC RBC-ENTMCNC: 29.7 G/DL
MCV RBC AUTO: 92.2 FL
MONOCYTES # BLD AUTO: 0.61 K/UL
MONOCYTES NFR BLD AUTO: 11.1 %
NEUTROPHILS # BLD AUTO: 2.71 K/UL
NEUTROPHILS NFR BLD AUTO: 49.5 %
PLATELET # BLD AUTO: 244 K/UL
POTASSIUM SERPL-SCNC: 4.4 MMOL/L
PROT SERPL-MCNC: 7.4 G/DL
RBC # BLD: 5 M/UL
RBC # FLD: 14.1 %
SODIUM SERPL-SCNC: 140 MMOL/L
WBC # FLD AUTO: 5.48 K/UL

## 2025-07-10 ENCOUNTER — NON-APPOINTMENT (OUTPATIENT)
Age: 30
End: 2025-07-10

## 2025-07-11 LAB — VORICONAZOLE SERPL-MCNC: 0.2 MCG/ML

## 2025-09-16 ENCOUNTER — APPOINTMENT (OUTPATIENT)
Dept: INFECTIOUS DISEASE | Facility: CLINIC | Age: 30
End: 2025-09-16

## (undated) DEVICE — BUR MEDTRONIC ENT TAPERED DIAMOND CHOANAL ATRESIA 15 DEGREE 4MM X 13CM

## (undated) DEVICE — BLADE MEDTRONIC ENT RAD 60 DEGREE ROTATABLE 4MM X 11CM

## (undated) DEVICE — SUT PLAIN GUT 4-0 18" SC-1

## (undated) DEVICE — Device

## (undated) DEVICE — SYR LUER LOK 50CC

## (undated) DEVICE — SUT PROLENE 2-0 30" CT-2

## (undated) DEVICE — ACCLARENT SET INFLATION DEVICE

## (undated) DEVICE — MEDTRONIC INSTRUMENT TRACKER ENT

## (undated) DEVICE — MEDTRONIC AXIEM PATIENT TRACKER NON-INVASIVE

## (undated) DEVICE — DRSG GAUZE SPONGE 2X2" STERILE

## (undated) DEVICE — ELCTR BOVIE SUCTION 8FR 6"

## (undated) DEVICE — BLADE MEDTRONIC ENT FUSION QUADCUT ROTATABLE STRAIGHT 4.3MM X 13CM

## (undated) DEVICE — PACK RHINOPLASTY

## (undated) DEVICE — WARMING BLANKET LOWER ADULT

## (undated) DEVICE — SUT CHROMIC 4-0 18" G-3

## (undated) DEVICE — SUCTION CATH ARGYLE WHISTLE TIP 14FR STRAIGHT PACKED

## (undated) DEVICE — VENODYNE/SCD SLEEVE CALF MEDIUM

## (undated) DEVICE — BLADE MEDTRONIC ENT INFERIOR TURBINATE ROTATABLE STRAIGHT 2.9MM X 11CM

## (undated) DEVICE — BLADE MEDTRONIC ENT RAD 90 DEGREE ROTATABLE 3.5MM X 11CM

## (undated) DEVICE — SUT PROLENE 4-0 36" RB-1

## (undated) DEVICE — ELCTR BOVIE PENCIL BLADE 10FT

## (undated) DEVICE — PETRI DISH MED 3.5"

## (undated) DEVICE — DRSG MEROCEL SPLINT SILICONE

## (undated) DEVICE — SYR ASEPTO

## (undated) DEVICE — GLV 7 PROTEXIS (WHITE)

## (undated) DEVICE — MARKING PEN W RULER

## (undated) DEVICE — SOL ANTI FOG

## (undated) DEVICE — DRSG TELFA 3 X 8

## (undated) DEVICE — STAPLER SKIN VISI-STAT 35 WIDE

## (undated) DEVICE — DRAPE MAYO STAND 23"

## (undated) DEVICE — DRSG STERISTRIPS 0.5 X 4"